# Patient Record
Sex: FEMALE | Race: WHITE | Employment: PART TIME | URBAN - METROPOLITAN AREA
[De-identification: names, ages, dates, MRNs, and addresses within clinical notes are randomized per-mention and may not be internally consistent; named-entity substitution may affect disease eponyms.]

---

## 2017-08-31 NOTE — PATIENT INSTRUCTIONS
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
## 2017-09-05 ENCOUNTER — OFFICE VISIT (OUTPATIENT)
Dept: FAMILY MEDICINE | Facility: CLINIC | Age: 62
End: 2017-09-05

## 2017-09-05 VITALS
OXYGEN SATURATION: 98 % | BODY MASS INDEX: 43.36 KG/M2 | WEIGHT: 254 LBS | TEMPERATURE: 98.6 F | HEIGHT: 64 IN | HEART RATE: 100 BPM | RESPIRATION RATE: 18 BRPM | SYSTOLIC BLOOD PRESSURE: 134 MMHG | DIASTOLIC BLOOD PRESSURE: 82 MMHG

## 2017-09-05 DIAGNOSIS — E63.9 NUTRITIONAL DEFICIENCY: ICD-10-CM

## 2017-09-05 DIAGNOSIS — E55.9 VITAMIN D DEFICIENCY: ICD-10-CM

## 2017-09-05 DIAGNOSIS — Z23 VACCINE FOR DIPHTHERIA-TETANUS: ICD-10-CM

## 2017-09-05 DIAGNOSIS — Z13.0 SCREENING, ANEMIA, DEFICIENCY, IRON: ICD-10-CM

## 2017-09-05 DIAGNOSIS — Z13.228 SCREENING FOR METABOLIC DISORDER: ICD-10-CM

## 2017-09-05 DIAGNOSIS — Z13.6 ENCOUNTER FOR LIPID SCREENING FOR CARDIOVASCULAR DISEASE: ICD-10-CM

## 2017-09-05 DIAGNOSIS — Z00.00 ROUTINE HISTORY AND PHYSICAL EXAMINATION OF ADULT: ICD-10-CM

## 2017-09-05 DIAGNOSIS — Z83.3 FHX: DIABETES MELLITUS: ICD-10-CM

## 2017-09-05 DIAGNOSIS — R25.1 TREMOR: ICD-10-CM

## 2017-09-05 DIAGNOSIS — Z12.31 ENCOUNTER FOR SCREENING MAMMOGRAM FOR BREAST CANCER: ICD-10-CM

## 2017-09-05 DIAGNOSIS — Z23 VACCINE FOR STREPTOCOCCUS PNEUMONIAE AND INFLUENZA: ICD-10-CM

## 2017-09-05 DIAGNOSIS — Z23 NEED FOR PROPHYLACTIC VACCINATION AND INOCULATION AGAINST INFLUENZA: ICD-10-CM

## 2017-09-05 DIAGNOSIS — L84 CALLUS OF FOOT: ICD-10-CM

## 2017-09-05 DIAGNOSIS — Z13.820 SCREENING FOR OSTEOPOROSIS: Primary | ICD-10-CM

## 2017-09-05 DIAGNOSIS — Z13.220 ENCOUNTER FOR LIPID SCREENING FOR CARDIOVASCULAR DISEASE: ICD-10-CM

## 2017-09-05 DIAGNOSIS — Z11.59 NEED FOR HEPATITIS C SCREENING TEST: ICD-10-CM

## 2017-09-05 LAB
% GRANULOCYTES: 71.1 % (ref 42.2–75.2)
HCT VFR BLD AUTO: 43.8 % (ref 35–46)
HEMOGLOBIN: 14.5 G/DL (ref 11.8–15.5)
LYMPHOCYTES NFR BLD AUTO: 23.3 % (ref 20.5–51.1)
MCH RBC QN AUTO: 28.9 PG (ref 27–31)
MCHC RBC AUTO-ENTMCNC: 33 G/DL (ref 33–37)
MCV RBC AUTO: 87.4 FL (ref 80–100)
MONOCYTES NFR BLD AUTO: 5.6 % (ref 1.7–9.3)
PLATELET # BLD AUTO: 234 K/UL (ref 140–450)
RBC # BLD AUTO: 5.01 X10/CMM (ref 3.7–5.2)
WBC # BLD AUTO: 5.8 X10/CMM (ref 3.8–11)

## 2017-09-05 PROCEDURE — 85025 COMPLETE CBC W/AUTO DIFF WBC: CPT | Performed by: FAMILY MEDICINE

## 2017-09-05 PROCEDURE — 90686 IIV4 VACC NO PRSV 0.5 ML IM: CPT | Performed by: FAMILY MEDICINE

## 2017-09-05 PROCEDURE — 90715 TDAP VACCINE 7 YRS/> IM: CPT | Performed by: FAMILY MEDICINE

## 2017-09-05 PROCEDURE — 99213 OFFICE O/P EST LOW 20 MIN: CPT | Mod: 25 | Performed by: FAMILY MEDICINE

## 2017-09-05 PROCEDURE — 90732 PPSV23 VACC 2 YRS+ SUBQ/IM: CPT | Performed by: FAMILY MEDICINE

## 2017-09-05 PROCEDURE — 36415 COLL VENOUS BLD VENIPUNCTURE: CPT | Performed by: FAMILY MEDICINE

## 2017-09-05 PROCEDURE — 90472 IMMUNIZATION ADMIN EACH ADD: CPT | Performed by: FAMILY MEDICINE

## 2017-09-05 PROCEDURE — 99386 PREV VISIT NEW AGE 40-64: CPT | Mod: 25 | Performed by: FAMILY MEDICINE

## 2017-09-05 PROCEDURE — 90471 IMMUNIZATION ADMIN: CPT | Performed by: FAMILY MEDICINE

## 2017-09-05 RX ORDER — PROPRANOLOL HYDROCHLORIDE 20 MG/1
20 TABLET ORAL 2 TIMES DAILY
Qty: 60 TABLET | Refills: 1 | Status: SHIPPED | OUTPATIENT
Start: 2017-09-05 | End: 2017-11-03

## 2017-09-05 NOTE — MR AVS SNAPSHOT
After Visit Summary   9/5/2017    Lucero Davis    MRN: [id number]           Patient Information     Date Of Birth          1955        Visit Information        Provider Department      9/5/2017 1:00 PM Karin Lundberg MD [facility]        Today's Diagnoses     Screening for osteoporosis    -  1    Tremor        Routine history and physical examination of adult        Screening, anemia, deficiency, iron        Screening for metabolic disorder        Encounter for screening mammogram for breast cancer        Vaccine for streptococcus pneumoniae and influenza        Encounter for lipid screening for cardiovascular disease        Need for hepatitis C screening test        Vitamin D deficiency        Vaccine for diphtheria-tetanus        FHx: diabetes mellitus          Care Instructions      Preventive Health Recommendations  Female Ages 50 - 64    Yearly exam: See your health care provider every year in order to  o Review health changes.   o Discuss preventive care.    o Review your medicines if your doctor has prescribed any.      Get a Pap test every three years (unless you have an abnormal result and your provider advises testing more often).    If you get Pap tests with HPV test, you only need to test every 5 years, unless you have an abnormal result.     You do not need a Pap test if your uterus was removed (hysterectomy) and you have not had cancer.    You should be tested each year for STDs (sexually transmitted diseases) if you're at risk.     Have a mammogram every 1 to 2 years.    Have a colonoscopy at age 50, or have a yearly FIT test (stool test). These exams screen for colon cancer.      Have a cholesterol test every 5 years, or more often if advised.    Have a diabetes test (fasting glucose) every three years. If you are at risk for diabetes, you should have this test more often.     If you are at risk for osteoporosis (brittle bone disease), think about having a bone  density scan (DEXA).    Shots: Get a flu shot each year. Get a tetanus shot every 10 years.    Nutrition:     Eat at least 5 servings of fruits and vegetables each day.    Eat whole-grain bread, whole-wheat pasta and brown rice instead of white grains and rice.    Talk to your provider about Calcium and Vitamin D.     Lifestyle    Exercise at least 150 minutes a week (30 minutes a day, 5 days a week). This will help you control your weight and prevent disease.    Limit alcohol to one drink per day.    No smoking.     Wear sunscreen to prevent skin cancer.     See your dentist every six months for an exam and cleaning.    See your eye doctor every 1 to 2 years.    Cologuard call insurance to see if covered for colon cancer screen    Schedule mammogram and dexa scan for screening    Labs today    Trial Propranolol. Hold if HR< or= to 50    F/u Dr GOLD 2-4 weeks prn          Follow-ups after your visit        Future tests that were ordered for you today     Open Future Orders        Priority Expected Expires Ordered    MAMMO -  Screening Digital Bilateral ([facility]) Routine  9/5/2018 9/5/2017    DEXA - Hip/Pelvis/Spine ([facility]UT[facility]) Routine  9/5/2018 9/5/2017            Who to contact     If you have questions or need follow up information about today's clinic visit or your schedule please contact [facility] directly at [phone].  Normal or non-critical lab and imaging results will be communicated to you by MyChart, letter or phone within 4 business days after the clinic has received the results. If you do not hear from us within 7 days, please contact the clinic through MyChart or phone. If you have a critical or abnormal lab result, we will notify you by phone as soon as possible.  Submit refill requests through "[employer]" or call your pharmacy and they will forward the refill request to us. Please allow 3 business days for your refill to be completed.          Additional Information  "About Your Visit        [employer] Information     [employer] lets you send messages to your doctor, view your test results, renew your prescriptions, schedule appointments and more. To sign up, go to [email or web address] . Click on \"Log in\" on the left side of the screen, which will take you to the Welcome page. Then click on \"Sign up Now\" on the right side of the page.     You will be asked to enter the access code listed below, as well as some personal information. Please follow the directions to create your username and password.     Your access code is: [email or web address]  Expires: 2017  1:45 PM     Your access code will  in 90 days. If you need help or a new code, please call your [place] clinic or [phone].        Care EveryWhere ID     This is your Care EveryWhere ID. This could be used by other organizations to access your [place] medical records  [id number]        Your Vitals Were     Pulse Temperature Respirations Height Pulse Oximetry BMI (Body Mass Index)    100 98.6  F (37  C) (Oral) 18 1.626 m (5' 4\") 98% 43.6 kg/m2       Blood Pressure from Last 3 Encounters:   17 134/82    Weight from Last 3 Encounters:   17 115.2 kg (254 lb)              We Performed the Following     ADMIN: Vaccine, Initial (31309)     CBC with Diff/Plt ([facility])     Comp. Metabolic Panel (14) ([facility])     FLU VAC, SPLIT VIRUS IM > 3 YO (QUADRIVALENT) [09022]     HCV Antibody ([facility])     Hemoglobin A1C ([facility])     Lipid Panel ([facility])     Pneumococcal vaccine 23 valent PPSV23  (Pneumovax) [66763]     TDAP VACCINE (BOOSTRIX)     TSH ([facility])     Vitamin D  25-Hydroxy ([facility])          Today's Medication Changes          These changes are accurate as of: 17  1:50 PM.  If you have any questions, ask your nurse or doctor.               Start taking these medicines.        Dose/Directions    propranolol 20 MG tablet   Commonly known as:  INDERAL   Used for:  Tremor   Started by:  Karin Lundberg MD     "    Dose:  20 mg   Take 1 tablet (20 mg) by mouth 2 times daily   Quantity:  60 tablet   Refills:  1            Where to get your medicines      These medications were sent to [facility] [address] [address]  [address] [place] [id number]     Phone:  [phone]     propranolol 20 MG tablet                Primary Care Provider Office Phone # Fax #    Karin Lundberg [phone] [address]  [facility] [address]        Equal Access to Services     [facility]: Hadii aad ku hadasho Soomaali, waaxda luqadaha, qaybta kaalmada adeegyada, carlos guerrero hayaapenny archuleta . So [facility] [phone].    ATENCIÓN: Si habla español, tiene a adler disposición servicios gratuitos de asistencia lingüística. Ll[facility] [phone].    We comply with applicable federal civil rights laws and [place] laws. We do not discriminate on the basis of race, color, national origin, age, disability sex, sexual orientation or gender identity.            Thank you!     Thank you for choosing [facility]  for your care. Our goal is always to provide you with excellent care. Hearing back from our patients is one way we can continue to improve our services. Please take a few minutes to complete the written survey that you may receive in the mail after your visit with us. Thank you!             Your Updated Medication List - Protect others around you: Learn how to safely use, store and throw away your medicines at www.disposemymeds.org.          This list is accurate as of: 9/5/17  1:50 PM.  Always use your most recent med list.                   Brand Name Dispense Instructions for use Diagnosis    MULTIVITAL PO           propranolol 20 MG tablet    INDERAL    60 tablet    Take 1 tablet (20 mg) by mouth 2 times daily    Tremor

## 2017-09-05 NOTE — PROGRESS NOTES
Injectable Influenza Immunization Documentation    1.  Is the person to be vaccinated sick today?  No    2. Does the person to be vaccinated have an allergy to eggs or to a component of the vaccine?  No    3. Has the person to be vaccinated today ever had a serious reaction to influenza vaccine in the past?  No    4. Has the person to be vaccinated ever had Guillain-Edgewood syndrome?  No     Form completed by Rena Brooks MA September 5, 2017 1:57 PM

## 2017-09-05 NOTE — LETTER
[facility]  [address] [place]  [address]  Phone: [phone]    September 8, 2017      Lucero Davis  [address]  [facility] [address]              Dear Lucero,      Comprehensive Metabolic Panel (CMP) was normal. The lipid panel indicated that your LDL was 157, which means that your total cholesterol is elevated.  Eat a low cholesterol diet. Recheck fasting lipid levels in 1 year. Hepetitis C was negative. Your vitamin D levels were low. START  TAKING A VITAMIN D  SUPPLEMENT 2000 IU daily which you can find at your local pharmacy.    Your thyroid levels (TSH) and blood glucose (A1C) was normal. Complete Blood Count (CBC) was normal.        Sincerely,     Karin Lundberg M.D.    Results for orders placed or performed in visit on 09/05/17   CBC with Diff/Plt ([facility])   Result Value Ref Range    WBC x10/cmm 5.8 3.8 - 11.0 x10/cmm    % Lymphocytes 23.3 20.5 - 51.1 %    % Monocytes 5.6 1.7 - 9.3 %    % Granulocytes 71.1 42.2 - 75.2 %    RBC x10/cmm 5.01 3.7 - 5.2 x10/cmm    Hemoglobin 14.5 11.8 - 15.5 g/dl    Hematocrit 43.8 35 - 46 %    MCV 87.4 80 - 100 fL    MCH 28.9 27.0 - 31.0 pg    MCHC 33.0 33.0 - 37.0 g/dL    Platelet Count 234 140 - 450 K/uL   Comp. Metabolic Panel (14) ([facility])   Result Value Ref Range    Glucose 90 65 - 99 mg/dL    Urea Nitrogen 16 8 - 27 mg/dL    Creatinine 0.69 0.57 - 1.00 mg/dL    eGFR If NonAfricn Am 94 >59 mL/min/1.73    eGFR If Africn Am 109 >59 mL/min/1.73    BUN/Creatinine Ratio 23 12 - 28    Sodium 143 134 - 144 mmol/L    Potassium 4.0 3.5 - 5.2 mmol/L    Chloride 102 96 - 106 mmol/L    Total CO2 27 18 - 28 mmol/L    Calcium 9.1 8.7 - 10.3 mg/dL    Protein Total 7.1 6.0 - 8.5 g/dL    Albumin 4.4 3.6 - 4.8 g/dL    Globulin, Total 2.7 1.5 - 4.5 g/dL    A/G Ratio 1.6 1.2 - 2.2    Bilirubin Total 0.5 0.0 - 1.2 mg/dL    Alkaline Phosphatase 92 39 - 117 IU/L    AST 15 0 - 40 IU/L    ALT 12 0 - 32 IU/L    Narrative    Performed at:  01 - [facility]  [address]  [place], [place]  [id number]  : Tommy Gottlieb MD, Phone:  [phone]   Lipid Panel ([facility])   Result Value Ref Range    Cholesterol 233 (H) 100 - 199 mg/dL    Triglycerides 76 0 - 149 mg/dL    HDL Cholesterol 61 >39 mg/dL    VLDL Cholesterol Bo 15 5 - 40 mg/dL    LDL Cholesterol Calculated 157 (H) 0 - 99 mg/dL    LDL/HDL Ratio 2.6 0.0 - 3.2 ratio units    Narrative    Performed at:  01 - [facility] [address], [place], [place]  [id number]  : Tommy Gottlieb MD, Phone:  [phone]   HCV Antibody ([facility])   Result Value Ref Range    Hep C Virus Ab <0.1 0.0 - 0.9 s/co ratio    Narrative    Performed at:  01 - [facility] [address], [place], [place]  [id number]  : Tommy Gottlieb MD, Phone:  [phone]   Vitamin D  25-Hydroxy ([facility])   Result Value Ref Range    Vitamin D,25-Hydroxy 16.6 (L) 30.0 - 100.0 ng/mL    Narrative    Performed at:  01 - [facility] [address], [place], [place]  [id number]  : Tommy Gottlieb MD, Phone:  [phone]   TSH ([facility])   Result Value Ref Range    TSH 1.700 0.450 - 4.500 uIU/mL    Narrative    Performed at:  01 - [facility] [address], [place], [place]  [id number]  : Tommy Gottlieb MD, Phone:  [phone]   Hemoglobin A1C ([facility])   Result Value Ref Range    Hemoglobin A1C 5.6 4.8 - 5.6 %    Narrative    Performed at:  01 - [facility] [address], [place], [place]  [id number]  : Tommy Gottlieb MD, Phone:  [phone]

## 2017-09-06 LAB
ALBUMIN SERPL-MCNC: 4.4 G/DL (ref 3.6–4.8)
ALBUMIN/GLOB SERPL: 1.6 {RATIO} (ref 1.2–2.2)
ALP SERPL-CCNC: 92 IU/L (ref 39–117)
ALT SERPL-CCNC: 12 IU/L (ref 0–32)
AST SERPL-CCNC: 15 IU/L (ref 0–40)
BILIRUB SERPL-MCNC: 0.5 MG/DL (ref 0–1.2)
BUN SERPL-MCNC: 16 MG/DL (ref 8–27)
BUN/CREATININE RATIO: 23 (ref 12–28)
CALCIUM SERPL-MCNC: 9.1 MG/DL (ref 8.7–10.3)
CHLORIDE SERPLBLD-SCNC: 102 MMOL/L (ref 96–106)
CHOLEST SERPL-MCNC: 233 MG/DL (ref 100–199)
CREAT SERPL-MCNC: 0.69 MG/DL (ref 0.57–1)
EGFR IF AFRICN AM: 109 ML/MIN/1.73
EGFR IF NONAFRICN AM: 94 ML/MIN/1.73
GLOBULIN, TOTAL: 2.7 G/DL (ref 1.5–4.5)
GLUCOSE SERPL-MCNC: 90 MG/DL (ref 65–99)
HBA1C MFR BLD: 5.6 % (ref 4.8–5.6)
HCV AB SERPL QL IA: <0.1 S/CO RATIO (ref 0–0.9)
HDLC SERPL-MCNC: 61 MG/DL
LDL/HDL RATIO: 2.6 RATIO UNITS (ref 0–3.2)
LDLC SERPL CALC-MCNC: 157 MG/DL (ref 0–99)
POTASSIUM SERPL-SCNC: 4 MMOL/L (ref 3.5–5.2)
PROT SERPL-MCNC: 7.1 G/DL (ref 6–8.5)
SODIUM SERPL-SCNC: 143 MMOL/L (ref 134–144)
TOTAL CO2: 27 MMOL/L (ref 18–28)
TRIGL SERPL-MCNC: 76 MG/DL (ref 0–149)
TSH BLD-ACNC: 1.7 UIU/ML (ref 0.45–4.5)
VITAMIN D, 25-HYDROXY: 16.6 NG/ML (ref 30–100)
VLDLC SERPL CALC-MCNC: 15 MG/DL (ref 5–40)

## 2017-09-07 PROBLEM — L84 CALLUS OF FOOT: Status: ACTIVE | Noted: 2017-09-07

## 2017-09-07 PROBLEM — R25.1 TREMOR: Status: ACTIVE | Noted: 2017-09-07

## 2017-09-30 NOTE — PROGRESS NOTES
Cc f/u tremor    HCM:   Pap- needs to schedule still  Mammo yesterday  Colon Cancer screen Cologuard is covered Form completed today  Living will  Has form and still working on it    SUBJECTIVE:   Lucero Davis is a 62 year old female who presents to clinic today for the following health issues:    Patient is taking propranolol 10 mg in the AM, 10 mg in the afternoon, and 20 mg at night. She takes the medication with breakfast in the AM, and with no food in the afternoon and at night. Patient feels dizzy with lightheadedness after taking the medication, no nausea, no vomiting, no abdominal pain, no chest pain, no palpitations, no shortness of breath. States that the medication is effective in decreasing the amount of tremors she has throughout the day. Tremors are worse as the day progresses, but has no tremors present after waking up in the morning.     Tremor treated with Propranolol 20 mg tablet po BID- took dosing listed above    TSH was in the normal range      Duration: few months, ongoing     Description (location/character/radiation): Hands tremors, bilateral; left hand > right hand, no pain present    Intensity:  moderate    Accompanying signs and symptoms: no pain, no numbness, no tingling, no palpitations, no chest pain, no shortness of breath.     History (similar episodes/previous evaluation): None    Precipitating or alleviating factors: None    Therapies tried and outcome: see above medication.              Problem list and histories reviewed & adjusted, as indicated.  Additional history: as documented    Patient Active Problem List   Diagnosis     Tremor     Callus of foot     BMI 45.0-49.9, adult (H)     Osteopenia of both hips     Past Surgical History:   Procedure Laterality Date     EXTRACTION(S) DENTAL         Social History   Substance Use Topics     Smoking status: Never Smoker     Smokeless tobacco: Never Used     Alcohol use Yes      Comment: one can per month      Family History   Problem  Relation Age of Onset     Prostate Cancer Father      Colon Cancer Maternal Grandmother      HEART DISEASE Maternal Grandfather      Colon Cancer Paternal Grandmother      Influenza/Pneumonia Paternal Grandfather          Current Outpatient Prescriptions   Medication Sig Dispense Refill     Multiple Vitamins-Minerals (MULTIVITAL PO)        propranolol (INDERAL) 20 MG tablet Take 1 tablet (20 mg) by mouth 2 times daily 60 tablet 1     No Known Allergies  Recent Labs   Lab Test  09/05/17   1409   A1C  5.6   LDL  157*   HDL  61   TRIG  76   ALT  12   CR  0.69   POTASSIUM  4.0      BP Readings from Last 3 Encounters:   10/06/17 144/86   09/05/17 134/82    Wt Readings from Last 3 Encounters:   09/05/17 115.2 kg (254 lb)                  Labs reviewed in EPIC    Now can:  Cooking- can carry the pots/pans better  Writing is still hard  Worse tremor when anxious    Sleep 8 hrs  Appetite good  Exercise foot pain still, has appointment this afternoon    Smoking no  ETOH no  Street drugs/MJ no  Caffeine 1/2 c in am coffee      Reviewed and updated as needed this visit by clinical staff     Reviewed and updated as needed this visit by Provider         ROS:  Constitutional, HEENT, cardiovascular, pulmonary, GI, , musculoskeletal, neuro, skin, endocrine and psych systems are negative, except as otherwise noted.      OBJECTIVE:   /86  Pulse 72  Temp 98.3  F (36.8  C) (Oral)  Resp 16  SpO2 98%  There is no height or weight on file to calculate BMI.  GENERAL: healthy, alert and no distress obese white female glasses  EYES: Eyes grossly normal to inspection, PERRL and conjunctivae and sclerae normal  HENT: ear canals and TM's normal, nose and mouth without ulcers or lesions  NECK: no adenopathy, no asymmetry, masses, or scars and thyroid normal to palpation  RESP: lungs clear to auscultation - no rales, rhonchi or wheezes  CV: regular rate and rhythm, normal S1 S2, no S3 or S4, no murmur, click or rub, no peripheral edema  and peripheral pulses strong  ABDOMEN: soft, nontender, no hepatosplenomegaly, no masses and bowel sounds normal  MS: no gross musculoskeletal defects noted, no edema  SKIN: no suspicious lesions or rashes  NEURO: Normal strength and tone, mentation intact and speech normal Fine tremor better at rest but still present. No intension tremor. No cog wheeling.   PSYCH: mentation appears normal, affect normal/bright  LYMPH: no cervical, supraclavicular, axillary, or inguinal adenopathy    Diagnostic Test Results:  Results for orders placed or performed during the hospital encounter of 10/05/17   MAMMO -  Screening Digital Bilateral ([facility]UTURE/[facility])    Narrative    Examination: Bilateral digital screening mammography with computer  aided detection, 10/5/2017 2:33 PM.    Comparison: None    History: No current breast concerns.    BREAST DENSITY: Scattered fibroglandular densities.    COMMENTS:  No suspicious finding.      Impression    IMPRESSION: BI-RADS CATEGORY: 1 -  NEGATIVE.    RECOMMENDED FOLLOW-UP: Annual Mammography.      The patient will be notified of the results.     ZAFAR STEVEN MD       ASSESSMENT/PLAN:     ASSESSMENT / PLAN:  (G25.0) Essential tremor  (primary encounter diagnosis)  Comment: adding  Plan: gabapentin (NEURONTIN) 300 MG capsule                Patient Instructions   Propranolol continue the 10 mg/10mg and 20 mg    Add gabapentin 300 mg at bedtime.    F/u one month    Cologuard form          Karin Lundberg MD  [facility]

## 2017-10-05 ENCOUNTER — HOSPITAL ENCOUNTER (OUTPATIENT)
Dept: BONE DENSITY | Facility: CLINIC | Age: 62
Discharge: HOME | End: 2017-10-05
Attending: FAMILY MEDICINE | Admitting: FAMILY MEDICINE
Payer: COMMERCIAL

## 2017-10-05 ENCOUNTER — HOSPITAL ENCOUNTER (OUTPATIENT)
Dept: MAMMOGRAPHY | Facility: CLINIC | Age: 62
End: 2017-10-05
Attending: FAMILY MEDICINE
Payer: COMMERCIAL

## 2017-10-05 DIAGNOSIS — Z13.820 SCREENING FOR OSTEOPOROSIS: ICD-10-CM

## 2017-10-05 DIAGNOSIS — Z12.31 ENCOUNTER FOR SCREENING MAMMOGRAM FOR BREAST CANCER: ICD-10-CM

## 2017-10-05 PROCEDURE — 77080 DXA BONE DENSITY AXIAL: CPT

## 2017-10-05 PROCEDURE — G0202 SCR MAMMO BI INCL CAD: HCPCS

## 2017-10-06 ENCOUNTER — OFFICE VISIT (OUTPATIENT)
Dept: FAMILY MEDICINE | Facility: CLINIC | Age: 62
End: 2017-10-06

## 2017-10-06 VITALS
RESPIRATION RATE: 16 BRPM | TEMPERATURE: 98.3 F | DIASTOLIC BLOOD PRESSURE: 86 MMHG | OXYGEN SATURATION: 98 % | HEART RATE: 72 BPM | SYSTOLIC BLOOD PRESSURE: 144 MMHG

## 2017-10-06 DIAGNOSIS — G25.0 ESSENTIAL TREMOR: Primary | ICD-10-CM

## 2017-10-06 PROBLEM — M85.852 OSTEOPENIA OF BOTH HIPS: Status: ACTIVE | Noted: 2017-10-06

## 2017-10-06 PROBLEM — M85.851 OSTEOPENIA OF BOTH HIPS: Status: ACTIVE | Noted: 2017-10-06

## 2017-10-06 PROCEDURE — 99214 OFFICE O/P EST MOD 30 MIN: CPT | Performed by: FAMILY MEDICINE

## 2017-10-06 RX ORDER — GABAPENTIN 300 MG/1
CAPSULE ORAL
Qty: 30 CAPSULE | Refills: 3 | Status: SHIPPED | OUTPATIENT
Start: 2017-10-06 | End: 2017-11-03

## 2017-10-06 NOTE — MR AVS SNAPSHOT
"              After Visit Summary   10/6/2017    Lucero Davis    MRN: [id number]           Patient Information     Date Of Birth          1955        Visit Information        Provider Department      10/6/2017 9:00 AM Karin Lundberg MD [facility]        Today's Diagnoses     Essential tremor    -  1      Care Instructions    Propranolol continue the 10 mg/10mg and 20 mg    Add gabapentin 300 mg at bedtime.    F/u one month    Cologuard form              Follow-ups after your visit        Who to contact     If you have questions or need follow up information about today's clinic visit or your schedule please contact [facility] directly at [phone].  Normal or non-critical lab and imaging results will be communicated to you by Vayablehart, letter or phone within 4 business days after the clinic has received the results. If you do not hear from us within 7 days, please contact the clinic through Vayablehart or phone. If you have a critical or abnormal lab result, we will notify you by phone as soon as possible.  Submit refill requests through Big Think or call your pharmacy and they will forward the refill request to us. Please allow 3 business days for your refill to be completed.          Additional Information About Your Visit        MyChart Information     Big Think lets you send messages to your doctor, view your test results, renew your prescriptions, schedule appointments and more. To sign up, go to [email or web address] . Click on \"Log in\" on the left side of the screen, which will take you to the Welcome page. Then click on \"Sign up Now\" on the right side of the page.     You will be asked to enter the access code listed below, as well as some personal information. Please follow the directions to create your username and password.     Your access code is: [email or web address]  Expires: 2017  1:45 PM     Your access code will  in 90 days. If you need help or a new code, please " call your [place] clinic or [phone].        Care EveryWhere ID     This is your Care EveryWhere ID. This could be used by other organizations to access your [place] medical records  [id number]        Your Vitals Were     Pulse Temperature Respirations Pulse Oximetry          72 98.3  F (36.8  C) (Oral) 16 98%         Blood Pressure from Last 3 Encounters:   10/06/17 144/86   09/05/17 134/82    Weight from Last 3 Encounters:   09/05/17 115.2 kg (254 lb)              Today, you had the following     No orders found for display         Today's Medication Changes          These changes are accurate as of: 10/6/17  9:34 AM.  If you have any questions, ask your nurse or doctor.               Start taking these medicines.        Dose/Directions    gabapentin 300 MG capsule   Commonly known as:  NEURONTIN   Used for:  Essential tremor   Started by:  Karin Lundberg MD        Take 1 tablet (300 mg) every night for 1-3 days, then 1 tablet twice daily for 1-3 days, then 1 tablet three times daily   Quantity:  30 capsule   Refills:  3            Where to get your medicines      These medications were sent to [facility] [address] [place] [address] & [address], [place] [address]     Phone:  [phone]     gabapentin 300 MG capsule                Primary Care Provider Office Phone # Fax #    Karin Lundberg [phone] [address] [place] [address]        Equal Access to Services     [facility]: Hadii aad ku hadasho Soomaali, waaxda luqadaha, qaybta kaalmada adeegyada, waxay cesar haymatthew archuleta . So [facility] [phone].    ATENCIÓN: Si habla español, tiene a adler disposición servicios gratuitos de asistencia lingüística. Llame al [phone].    We comply with applicable federal civil rights laws and [place] laws. We do not discriminate on the basis of race, color, national origin, age, disability, sex,  sexual orientation, or gender identity.            Thank you!     Thank you for choosing [facility]  for your care. Our goal is always to provide you with excellent care. Hearing back from our patients is one way we can continue to improve our services. Please take a few minutes to complete the written survey that you may receive in the mail after your visit with us. Thank you!             Your Updated Medication List - Protect others around you: Learn how to safely use, store and throw away your medicines at www.disposemymeds.org.          This list is accurate as of: 10/6/17  9:34 AM.  Always use your most recent med list.                   Brand Name Dispense Instructions for use Diagnosis    gabapentin 300 MG capsule    NEURONTIN    30 capsule    Take 1 tablet (300 mg) every night for 1-3 days, then 1 tablet twice daily for 1-3 days, then 1 tablet three times daily    Essential tremor       MULTIVITAL PO       Nutritional deficiency       propranolol 20 MG tablet    INDERAL    60 tablet    Take 1 tablet (20 mg) by mouth 2 times daily    Tremor

## 2017-10-06 NOTE — PATIENT INSTRUCTIONS
Propranolol continue the 10 mg/10mg and 20 mg    Add gabapentin 300 mg at bedtime.    F/u one month    Cologuard form

## 2017-10-25 NOTE — PROGRESS NOTES
"Plan from OV on 10/6/2017:   Propranolol continue the 10 mg/10mg and 20 mg  Add gabapentin 300 mg at bedtime.  F/u one month  Cologuard form- came in the mail but still needs to do    HCM:   PAP- will schedule when she is able to do so.   Colon Cancer- received package yesterday.   Living Will- form given to patient       Cc: Follow-up for essential  Tremor; currently taking Propranolol; 10 mg in the AM, 10 mg in the afternoon, and 20 mg at bedtime.   Gabapentin 300 mg BID last took it Sunday ran out - effective with Propranolol for tremors, need refill. Unable to refill due to quantity of Rx #30, per pharmacy. Makes her tired.  SUBJECTIVE:   Lucero Davis is a 62 year old female who presents to clinic today for the following health issues:    Follow-up Essential Tremors       Duration: Follow-up, ongoing     Description (location/character/radiation): Tremors in both hands, increases when nervous     Intensity:  Tremors has improved since last OV. Currently tremors are 6/10, tremors at worse 8/10    Accompanying signs and symptoms: no pain, no numbness, no tingling, no burning sensations, no new symptoms.     History (similar episodes/previous evaluation): None    Precipitating or alleviating factors: None    Therapies tried and outcome: Propranolol  And Gabapentin -effective for tremors.        Hand tremors today.  Feeding self better-soup    Sleep ok  Appetite ok  Exercise foot hurts limiting walking. Podiatry xray negative fracture. They wanted to give her a brace- planning to buy one on line.  The podiatrist thought it was more of a neurology issue.     Neurology- referral done    Estimated body mass index is 43.6 kg/(m^2) as calculated from the following:    Height as of 9/5/17: 1.626 m (5' 4\").    Weight as of 9/5/17: 115.2 kg (254 lb).  Refer weight loss clinic    Smoking no  ETOH no  Street drugs/MJ no  Caffeine 1/2 c coffee            Problem list and histories reviewed & adjusted, as " indicated.  Additional history: as documented    Patient Active Problem List   Diagnosis     Tremor     Callus of foot     BMI 45.0-49.9, adult (H)     Osteopenia of both hips     Past Surgical History:   Procedure Laterality Date     EXTRACTION(S) DENTAL         Social History   Substance Use Topics     Smoking status: Never Smoker     Smokeless tobacco: Never Used     Alcohol use Yes      Comment: one can per month      Family History   Problem Relation Age of Onset     Prostate Cancer Father      Colon Cancer Maternal Grandmother      HEART DISEASE Maternal Grandfather      Colon Cancer Paternal Grandmother      Influenza/Pneumonia Paternal Grandfather          Current Outpatient Prescriptions   Medication Sig Dispense Refill     gabapentin (NEURONTIN) 300 MG capsule Take 1 tablet (300 mg) every night for 1-3 days, then 1 tablet twice daily for 1-3 days, then 1 tablet three times daily 30 capsule 3     Multiple Vitamins-Minerals (MULTIVITAL PO)        propranolol (INDERAL) 20 MG tablet Take 1 tablet (20 mg) by mouth 2 times daily 60 tablet 1     No Known Allergies  Recent Labs   Lab Test  09/05/17   1409   A1C  5.6   LDL  157*   HDL  61   TRIG  76   ALT  12   CR  0.69   POTASSIUM  4.0      BP Readings from Last 3 Encounters:   11/03/17 144/84   10/06/17 144/86   09/05/17 134/82    Wt Readings from Last 3 Encounters:   09/05/17 115.2 kg (254 lb)                  Labs reviewed in EPIC          Reviewed and updated as needed this visit by clinical staff     Reviewed and updated as needed this visit by Provider         ROS:  Constitutional, HEENT, cardiovascular, pulmonary, GI, , musculoskeletal, neuro, skin, endocrine and psych systems are negative, except as otherwise noted.    Foot and tremor starting this year in the Spring  Fhx negative parkinsons, essential tremor, no chemical exposures    OBJECTIVE:   /84  Pulse 84  Temp 97.8  F (36.6  C) (Oral)  Resp 16  SpO2 97%  There is no height or weight on  file to calculate BMI.  GENERAL: healthy, alert and no distress  EYES: Eyes grossly normal to inspection, PERRL and conjunctivae and sclerae normal  HENT: ear canals and TM's normal, nose and mouth without ulcers or lesions  NECK: no adenopathy, no asymmetry, masses, or scars and thyroid normal to palpation  RESP: lungs clear to auscultation - no rales, rhonchi or wheezes  CV: regular rate and rhythm, normal S1 S2, no S3 or S4, no murmur, click or rub, no peripheral edema and peripheral pulses strong  ABDOMEN: soft, nontender, no hepatosplenomegaly, no masses and bowel sounds normal  MS: no gross musculoskeletal defects noted, no edema  SKIN: no suspicious lesions or rashes  NEURO: Normal strength and tone, mentation intact and speech normal  L>R hand tremor, chin mild intermittent  Foot issues per podiatry  PSYCH: mentation appears normal, affect normal/bright  LYMPH: no cervical, supraclavicular, axillary, or inguinal adenopathy    Diagnostic Test Results:  Results for orders placed or performed during the hospital encounter of 10/05/17   MAMMO -  Screening Digital Bilateral (FUTURE/[facility] Breast [facility])    Narrative    Examination: Bilateral digital screening mammography with computer  aided detection, 10/5/2017 2:33 PM.    Comparison: None    History: No current breast concerns.    BREAST DENSITY: Scattered fibroglandular densities.    COMMENTS:  No suspicious finding.      Impression    IMPRESSION: BI-RADS CATEGORY: 1 -  NEGATIVE.    RECOMMENDED FOLLOW-UP: Annual Mammography.      The patient will be notified of the results.     ZAFAR STEVEN MD       ASSESSMENT/PLAN:     ASSESSMENT / PLAN:  (Z68.41) BMI 40.0-44.9, adult (H)  (primary encounter diagnosis)  Comment:   Plan: BARIATRIC ADULT REFERRAL            (G25.0) Essential tremor  Comment:   Plan: gabapentin (NEURONTIN) 300 MG capsule,         propranolol (INDERAL) 20 MG tablet                Patient Instructions   Refills done    Schedule neurology consult and  weight loss clinic      Karin Lundberg MD  [facility]

## 2017-11-03 ENCOUNTER — OFFICE VISIT (OUTPATIENT)
Dept: FAMILY MEDICINE | Facility: CLINIC | Age: 62
End: 2017-11-03

## 2017-11-03 VITALS
SYSTOLIC BLOOD PRESSURE: 146 MMHG | HEART RATE: 84 BPM | DIASTOLIC BLOOD PRESSURE: 88 MMHG | TEMPERATURE: 97.8 F | OXYGEN SATURATION: 97 % | RESPIRATION RATE: 16 BRPM

## 2017-11-03 DIAGNOSIS — G25.0 ESSENTIAL TREMOR: ICD-10-CM

## 2017-11-03 PROCEDURE — 99214 OFFICE O/P EST MOD 30 MIN: CPT | Performed by: FAMILY MEDICINE

## 2017-11-03 RX ORDER — PROPRANOLOL HYDROCHLORIDE 20 MG/1
20 TABLET ORAL SEE ADMIN INSTRUCTIONS
Qty: 60 TABLET | Refills: 3 | Status: SHIPPED | OUTPATIENT
Start: 2017-11-03 | End: 2017-12-04 | Stop reason: DRUGHIGH

## 2017-11-03 RX ORDER — GABAPENTIN 300 MG/1
CAPSULE ORAL
Qty: 90 CAPSULE | Refills: 3 | Status: SHIPPED | OUTPATIENT
Start: 2017-11-03 | End: 2017-12-04

## 2017-11-03 NOTE — MR AVS SNAPSHOT
After Visit Summary   11/3/2017    Lucero Davis    MRN: [id number]           Patient Information     Date Of Birth          1955        Visit Information        Provider Department      11/3/2017 9:00 AM Karin Lundberg MD [facility]        Today's Diagnoses     BMI 40.0-44.9, adult (H)    -  1    Essential tremor          Care Instructions    Refills done    Schedule neurology consult and weight loss clinic          Follow-ups after your visit        Additional Services     BARIATRIC ADULT REFERRAL       Your provider has referred you to: [facility]: [facility] Surgical Weight Loss [facility] [phone]. [email or web address]    Please be aware that coverage of these services is subject to the terms and limitations of your health insurance plan.  Call member services at your health plan with any benefit or coverage questions.      Please bring the following with you to your appointment:      (1) List of current medications   (2) This referral request   (3) Any documents/labs given to you for this referral                  Who to contact     If you have questions or need follow up information about today's clinic visit or your schedule please contact [facility] directly at [phone].  Normal or non-critical lab and imaging results will be communicated to you by MyChart, letter or phone within 4 business days after the clinic has received the results. If you do not hear from us within 7 days, please contact the clinic through MyChart or phone. If you have a critical or abnormal lab result, we will notify you by phone as soon as possible.  Submit refill requests through Ibercheck or call your pharmacy and they will forward the refill request to us. Please allow 3 business days for your refill to be completed.          Additional Information About Your Visit        MyChart Information     [employer] Biotechhart  "lets you send messages to your doctor, view your test results, renew your prescriptions, schedule appointments and more. To sign up, go to [email or web address] . Click on \"Log in\" on the left side of the screen, which will take you to the Welcome page. Then click on \"Sign up Now\" on the right side of the page.     You will be asked to enter the access code listed below, as well as some personal information. Please follow the directions to create your username and password.     Your access code is: [email or web address]  Expires: 2017  1:45 PM     Your access code will  in 90 days. If you need help or a new code, please call your [place] clinic or [phone].        Care EveryWhere ID     This is your Care EveryWhere ID. This could be used by other organizations to access your [place] medical records  [id number]        Your Vitals Were     Pulse Temperature Respirations Pulse Oximetry          84 97.8  F (36.6  C) (Oral) 16 97%         Blood Pressure from Last 3 Encounters:   17 144/84   10/06/17 144/86   17 134/82    Weight from Last 3 Encounters:   17 115.2 kg (254 lb)              We Performed the Following     BARIATRIC ADULT REFERRAL          Today's Medication Changes          These changes are accurate as of: 11/3/17  9:44 AM.  If you have any questions, ask your nurse or doctor.               These medicines have changed or have updated prescriptions.        Dose/Directions    propranolol 20 MG tablet   Commonly known as:  INDERAL   This may have changed:    - when to take this  - additional instructions   Used for:  Essential tremor   Changed by:  Karin Lundberg MD        Dose:  20 mg   Take 1 tablet (20 mg) by mouth See Admin Instructions 10 mg am, 10 mg afternoon, 20 mg QHS   Quantity:  60 tablet   Refills:  3            Where to get your medicines      These medications were sent to [facility] [address] [place] [address] - [address] "  [address] [facility] [address]     Phone:  [phone]     gabapentin 300 MG capsule    propranolol 20 MG tablet                Primary Care Provider Office Phone # Fax #    Karin Lundberg [phone] [address]  [facility] [address]        Equal Access to Services     [facility]: Hadii aad ku hadasho Soomaali, waaxda luqadaha, qaybta kaalmada adeegyada, waxay idiin hayaan adeeg kharash la'aan . So [facility] [phone].    ATENCIÓN: Si habla español, tiene a adler disposición servicios gratuitos de asistencia lingüística. Llame al [phone].    We comply with applicable federal civil rights laws and [place] laws. We do not discriminate on the basis of race, color, national origin, age, disability, sex, sexual orientation, or gender identity.            Thank you!     Thank you for choosing [facility]  for your care. Our goal is always to provide you with excellent care. Hearing back from our patients is one way we can continue to improve our services. Please take a few minutes to complete the written survey that you may receive in the mail after your visit with us. Thank you!             Your Updated Medication List - Protect others around you: Learn how to safely use, store and throw away your medicines at www.disposemymeds.org.          This list is accurate as of: 11/3/17  9:44 AM.  Always use your most recent med list.                   Brand Name Dispense Instructions for use Diagnosis    gabapentin 300 MG capsule    NEURONTIN    90 capsule    Take 1 tablet (300 mg) every night for 1-3 days, then 1 tablet twice daily for 1-3 days, then 1 tablet three times daily    Essential tremor       MULTIVITAL PO       Nutritional deficiency       propranolol 20 MG tablet    INDERAL    60 tablet    Take 1 tablet (20 mg) by mouth See Admin Instructions 10 mg am, 10 mg afternoon, 20 mg QHS    Essential tremor

## 2017-11-30 NOTE — PROGRESS NOTES
HCM:  PAP will consider in the future  Colon Cancer Screen  Got colo guard  Living Will has form    Last OV on 11/3/2017:  Refills done; gabapentin 300 mg, take one tablet TID, last refill on 11/3/2017 #90 with 3 refills and propranolol 20 mg, last refill on 11/3/2017 with 3 refills.    Schedule neurology consult and weight loss clinic  SUBJECTIVE:   Lucero Davis is a 62 year old female who presents to clinic today for the following health issues:    Essential Tremors       Duration: follow up, tremors seem to be worse today, has not had time to schedule appointment with weight loss clinic, never schedule appointment with neurology because she did not receive a call from them    Description (location/character/radiation): states that tremors has not improved or worsen since last OV       Intensity:  moderate    Accompanying signs and symptoms: none    History (similar episodes/previous evaluation): None    Precipitating or alleviating factors: none    Therapies tried and outcome: gabapentin, and propranolol - somewhat effective    No change in tremor  HR above 50  BP Readings from Last 3 Encounters:   12/04/17 148/86   11/03/17 146/88   10/06/17 144/86               Problem list and histories reviewed & adjusted, as indicated.  Additional history: as documented    Patient Active Problem List   Diagnosis     Tremor     Callus of foot     BMI 45.0-49.9, adult (H)     Osteopenia of both hips     Past Surgical History:   Procedure Laterality Date     EXTRACTION(S) DENTAL         Social History   Substance Use Topics     Smoking status: Never Smoker     Smokeless tobacco: Never Used     Alcohol use Yes      Comment: one can per month      Family History   Problem Relation Age of Onset     Prostate Cancer Father      Colon Cancer Maternal Grandmother      HEART DISEASE Maternal Grandfather      Colon Cancer Paternal Grandmother      Influenza/Pneumonia Paternal Grandfather          Current Outpatient Prescriptions    Medication Sig Dispense Refill     Vitamin D, Cholecalciferol, 1000 UNITS TABS        gabapentin (NEURONTIN) 300 MG capsule Take 1 tablet (300 mg) every night for 1-3 days, then 1 tablet twice daily for 1-3 days, then 1 tablet three times daily 90 capsule 3     propranolol (INDERAL) 20 MG tablet Take 1 tablet (20 mg) by mouth See Admin Instructions 10 mg am, 10 mg afternoon, 20 mg QHS 60 tablet 3     Multiple Vitamins-Minerals (MULTIVITAL PO)        No Known Allergies  Recent Labs   Lab Test  09/05/17   1409   A1C  5.6   LDL  157*   HDL  61   TRIG  76   ALT  12   CR  0.69   POTASSIUM  4.0      BP Readings from Last 3 Encounters:   12/04/17 148/86   11/03/17 146/88   10/06/17 144/86    Wt Readings from Last 3 Encounters:   12/04/17 117.9 kg (260 lb)   09/05/17 115.2 kg (254 lb)                  Labs reviewed in EPIC          Reviewed and updated as needed this visit by clinical staff       Reviewed and updated as needed this visit by Provider         ROS:  Constitutional, HEENT, cardiovascular, pulmonary, GI, , musculoskeletal, neuro, skin, endocrine and psych systems are negative, except as otherwise noted.      OBJECTIVE:   /86  Pulse 74  Temp 98.2  F (36.8  C) (Oral)  Resp 16  Wt 117.9 kg (260 lb)  SpO2 98%  BMI 44.63 kg/m2  Body mass index is 44.63 kg/(m^2).  GENERAL: healthy, alert and no distress  EYES: Eyes grossly normal to inspection, PERRL and conjunctivae and sclerae normal  HENT: ear canals and TM's normal, nose and mouth without ulcers or lesions  NECK: no adenopathy, no asymmetry, masses, or scars and thyroid normal to palpation  RESP: lungs clear to auscultation - no rales, rhonchi or wheezes  Tremor R>L fine  Tremor chin  CV: regular rate and rhythm, normal S1 S2, no S3 or S4, no murmur, click or rub, no peripheral edema and peripheral pulses strong  ABDOMEN: soft, nontender, no hepatosplenomegaly, no masses and bowel sounds normal  MS: no gross musculoskeletal defects noted, no  edema  SKIN: no suspicious lesions or rashes  NEURO: Normal strength and tone, mentation intact and speech normal  PSYCH: mentation appears normal, affect normal/bright  LYMPH: no cervical, supraclavicular, axillary, or inguinal adenopathy    Diagnostic Test Results:  Results for orders placed or performed during the hospital encounter of 10/05/17   MAMMO -  Screening Digital Bilateral ([facility]UTURE/[facility])    Narrative    Examination: Bilateral digital screening mammography with computer  aided detection, 10/5/2017 2:33 PM.    Comparison: None    History: No current breast concerns.    BREAST DENSITY: Scattered fibroglandular densities.    COMMENTS:  No suspicious finding.      Impression    IMPRESSION: BI-RADS CATEGORY: 1 -  NEGATIVE.    RECOMMENDED FOLLOW-UP: Annual Mammography.      The patient will be notified of the results.     ZAFAR STEVEN MD       ASSESSMENT/PLAN:     ASSESSMENT / PLAN:  (G25.0) Essential tremor  Comment: stop short active propranolol  Plan: gabapentin (NEURONTIN) 300 MG capsule,         propranolol (INDERAL LA) 60 MG 24 hr capsule        Schedule with neurology    Elevated BP  Continue to follow    Patient Instructions   Gabapentin 300 mg am/and 300 mg noon and 600 mg bedtime . New dose  Stop short acting propranolol 60 mg LA and start extended release at bedtime.    F/u 2 weeks  Monitor blood pressure and pulse    Schedule with neurology          Karin Lundberg MD  [facility]

## 2017-12-04 ENCOUNTER — OFFICE VISIT (OUTPATIENT)
Dept: FAMILY MEDICINE | Facility: CLINIC | Age: 62
End: 2017-12-04

## 2017-12-04 VITALS
SYSTOLIC BLOOD PRESSURE: 148 MMHG | HEART RATE: 74 BPM | BODY MASS INDEX: 44.63 KG/M2 | WEIGHT: 260 LBS | DIASTOLIC BLOOD PRESSURE: 86 MMHG | RESPIRATION RATE: 16 BRPM | OXYGEN SATURATION: 98 % | TEMPERATURE: 98.2 F

## 2017-12-04 DIAGNOSIS — G25.0 ESSENTIAL TREMOR: ICD-10-CM

## 2017-12-04 PROCEDURE — 99214 OFFICE O/P EST MOD 30 MIN: CPT | Performed by: FAMILY MEDICINE

## 2017-12-04 RX ORDER — MULTIVIT-MIN/IRON/FOLIC ACID/K 18-600-40
CAPSULE ORAL
COMMUNITY

## 2017-12-04 RX ORDER — PROPRANOLOL HCL 60 MG
60 CAPSULE, EXTENDED RELEASE 24HR ORAL DAILY
Qty: 14 CAPSULE | Refills: 0 | Status: SHIPPED | OUTPATIENT
Start: 2017-12-04 | End: 2017-12-18

## 2017-12-04 RX ORDER — GABAPENTIN 300 MG/1
CAPSULE ORAL
Qty: 120 CAPSULE | Refills: 3 | Status: SHIPPED | OUTPATIENT
Start: 2017-12-04 | End: 2017-12-18

## 2017-12-04 NOTE — PATIENT INSTRUCTIONS
Gabapentin 300 mg am/and 300 mg noon and 600 mg bedtime . New dose  Stop short acting propranolol 60 mg LA and start extended release at bedtime.    F/u 2 weeks  Monitor blood pressure and pulse    Schedule with neurology

## 2017-12-04 NOTE — MR AVS SNAPSHOT
"              After Visit Summary   12/4/2017    Lucero Davis    MRN: [id number]           Patient Information     Date Of Birth          1955        Visit Information        Provider Department      12/4/2017 3:45 PM Karin Lundberg MD [facility]        Today's Diagnoses     Essential tremor          Care Instructions    Gabapentin 300 mg am/and 300 mg noon and 600 mg bedtime . New dose  Stop short acting propranolol 60 mg LA and start extended release at bedtime.    F/u 2 weeks  Monitor blood pressure and pulse    Schedule with neurology              Follow-ups after your visit        Who to contact     If you have questions or need follow up information about today's clinic visit or your schedule please contact [facility] directly at [phone].  Normal or non-critical lab and imaging results will be communicated to you by [employer], letter or phone within 4 business days after the clinic has received the results. If you do not hear from us within 7 days, please contact the clinic through [email or web address] or phone. If you have a critical or abnormal lab result, we will notify you by phone as soon as possible.  Submit refill requests through [employer] or call your pharmacy and they will forward the refill request to us. Please allow 3 business days for your refill to be completed.          Additional Information About Your Visit        [facility] [facility] [employer] Information     [employer] lets you send messages to your doctor, view your test results, renew your prescriptions, schedule appointments and more. To sign up, go to [email or web address] . Click on \"Log in\" on the left side of the screen, which will take you to the Welcome page. Then click on \"Sign up Now\" on the right side of the page.     You will be asked to enter the access code listed below, as well as some personal information. Please follow the directions to create your username and password.     Your access code is: [email or web address]  Expires: " 3/4/2018  4:11 PM     Your access code will  in 90 days. If you need help or a new code, please call your [facility] clinic or [phone].        Care EveryWhere ID     This is your Care EveryWhere ID. This could be used by other organizations to access your [facility] medical records  [id number]        Your Vitals Were     Pulse Temperature Respirations Pulse Oximetry BMI (Body Mass Index)       74 98.2  F (36.8  C) (Oral) 16 98% 44.63 kg/m2        Blood Pressure from Last 3 Encounters:   17 148/86   17 146/88   10/06/17 144/86    Weight from Last 3 Encounters:   17 117.9 kg (260 lb)   17 115.2 kg (254 lb)              Today, you had the following     No orders found for display         Today's Medication Changes          These changes are accurate as of: 17  4:11 PM.  If you have any questions, ask your nurse or doctor.               Start taking these medicines.        Dose/Directions    propranolol 60 MG 24 hr capsule   Commonly known as:  INDERAL LA   Used for:  Essential tremor   Replaces:  propranolol 20 MG tablet   Started by:  Karin Lundberg MD        Dose:  60 mg   Take 1 capsule (60 mg) by mouth daily   Quantity:  14 capsule   Refills:  0         These medicines have changed or have updated prescriptions.        Dose/Directions    gabapentin 300 MG capsule   Commonly known as:  NEURONTIN   This may have changed:  additional instructions   Used for:  Essential tremor   Changed by:  Karin Lundberg MD        300 mg am, 300 mg noon and 600 mg bedtime New increase   Quantity:  120 capsule   Refills:  3         Stop taking these medicines if you haven't already. Please contact your care team if you have questions.     propranolol 20 MG tablet   Commonly known as:  INDERAL   Replaced by:  propranolol 60 MG 24 hr capsule   Stopped by:  Karin Lundberg MD                Where to get your medicines      These medications were sent to [facility] [id number] -  [address] & [address], [place] [address]     Phone:  [phone]     propranolol 60 MG 24 hr capsule         Some of these will need a paper prescription and others can be bought over the counter.  Ask your nurse if you have questions.     Bring a paper prescription for each of these medications     gabapentin 300 MG capsule                Primary Care Provider Office Phone # Fax #    Karin Lundberg [phone] [address]  [facility] [address]        Equal Access to Services     [facility]: Hadii aad ku hadasho Soomaali, waaxda luqadaha, qaybta kaalmada adeegyada, carlos guerrero hayaan adeeddi archuleta . So [facility] [phone].    ATENCIÓN: Si habla español, tiene a adler disposición servicios gratuitos de asistencia lingüística. Llame al [phone].    We comply with applicable federal civil rights laws and [place] laws. We do not discriminate on the basis of race, color, national origin, age, disability, sex, sexual orientation, or gender identity.            Thank you!     Thank you for choosing [facility]  for your care. Our goal is always to provide you with excellent care. Hearing back from our patients is one way we can continue to improve our services. Please take a few minutes to complete the written survey that you may receive in the mail after your visit with us. Thank you!             Your Updated Medication List - Protect others around you: Learn how to safely use, store and throw away your medicines at www.disposemymeds.org.          This list is accurate as of: 12/4/17  4:11 PM.  Always use your most recent med list.                   Brand Name Dispense Instructions for use Diagnosis    gabapentin 300 MG capsule    NEURONTIN    120 capsule    300 mg am, 300 mg noon and 600 mg bedtime New increase    Essential tremor       MULTIVITAL PO       Nutritional deficiency       propranolol 60 MG 24  hr capsule    INDERAL LA    14 capsule    Take 1 capsule (60 mg) by mouth daily    Essential tremor       Vitamin D (Cholecalciferol) 1000 UNITS Tabs

## 2017-12-07 NOTE — PROGRESS NOTES
12/4/17 we faxed 12/4/17 and 11/3/17 office notes to Dr. Estrada @ [phone]    Fareed Gray,   [facility]  [phone]

## 2017-12-07 NOTE — PROGRESS NOTES
12/4/17 we faxed 12/4/17 and 11/3/17 office notes to Dr. Estrada @ [phone]    Fareed Gray,   [facility]  [phone]

## 2017-12-08 ENCOUNTER — TRANSFERRED RECORDS (OUTPATIENT)
Dept: FAMILY MEDICINE | Facility: CLINIC | Age: 62
End: 2017-12-08

## 2017-12-14 NOTE — PROGRESS NOTES
12/5/17 Received fax from [employer], they are suspending the cologuard order because of inactivity.  This order will be reactivated if the patient returns their sample within 365 days of the initial order date.    Fareed Gray   [facility]  [phone]      10/8/18 Received another fax from [employer].  The order is now cancelled because it has been 365 days since order was placed.    Fareed Gray,   [facility]  [phone]

## 2017-12-18 ENCOUNTER — OFFICE VISIT (OUTPATIENT)
Dept: FAMILY MEDICINE | Facility: CLINIC | Age: 62
End: 2017-12-18

## 2017-12-18 VITALS
SYSTOLIC BLOOD PRESSURE: 156 MMHG | RESPIRATION RATE: 22 BRPM | WEIGHT: 259.92 LBS | HEART RATE: 82 BPM | TEMPERATURE: 98.9 F | OXYGEN SATURATION: 97 % | BODY MASS INDEX: 44.62 KG/M2 | DIASTOLIC BLOOD PRESSURE: 86 MMHG

## 2017-12-18 DIAGNOSIS — G25.0 ESSENTIAL TREMOR: ICD-10-CM

## 2017-12-18 DIAGNOSIS — I10 BENIGN ESSENTIAL HYPERTENSION: Primary | ICD-10-CM

## 2017-12-18 PROCEDURE — 99214 OFFICE O/P EST MOD 30 MIN: CPT | Performed by: FAMILY MEDICINE

## 2017-12-18 RX ORDER — PROPRANOLOL HYDROCHLORIDE 80 MG/1
80 CAPSULE, EXTENDED RELEASE ORAL DAILY
Qty: 30 CAPSULE | Refills: 3 | Status: SHIPPED | OUTPATIENT
Start: 2017-12-18 | End: 2018-03-23

## 2017-12-18 RX ORDER — CARBIDOPA AND LEVODOPA 25; 100 MG/1; MG/1
TABLET ORAL
Refills: 11 | COMMUNITY
Start: 2017-12-08

## 2017-12-18 NOTE — PROGRESS NOTES
HCM:  PAP screen Q3- wants to wait   Colon Cancer Screen - received Cologuard kit, and is covered by insurance   Living Will - has information       Cc: FU Tremors   SUBJECTIVE:   Lucero Davis is a 62 year old female who presents to clinic today for the following health issues:    Patient saw Neurologist on 12/15/2017; DC gabapentin 300 mg per Neurologist, started sinemet  mg 12/16/2017. Scheduled for MRI today, but was unable to tolerated being in the MRI machine; reschedule for 12/29/2017. Patient signed CH[facility] to have medical records released to [facility].    Dr. Marla Estrada MD at Office of Neurology in [place].     Tremors       Duration: Follow- up    Description (location/character/radiation): Tremors have improved since the start of Sinemet  mg but is experiencing dry mouth    Intensity:  Mild to moderate    Accompanying signs and symptoms: see above     History (similar episodes/previous evaluation): Saw Dr. Estrada, Neurologist     Precipitating or alleviating factors: None    Therapies tried and outcome: see above      Saw neurology [facility] Livia started Sinemet 25mg/100 mg po TID and she thinks that is helping  Off the gabapentin since 12/8/17 without side effect  Still on propranolol 60 mg /24 hours  F/u January 23rd  MRI was suppose to do that and too claustrophobic. Rescheduled for 29th.   Picking up script at [facility]  Dr Estrada thought it was Parkinson's (Per patient report)    /86 (BP Location: Left arm, Patient Position: Sitting, Cuff Size: Adult Large)  Pulse 82  Temp 98.9  F (37.2  C) (Oral)  Resp 22  Wt 117.9 kg (259 lb 14.8 oz)  SpO2 97%  BMI 44.62 kg/m2     Wt Readings from Last 4 Encounters:   12/18/17 117.9 kg (259 lb 14.8 oz)   12/04/17 117.9 kg (260 lb)   09/05/17 115.2 kg (254 lb)     Advil 400 mg TID  Salt rare              Problem list and histories reviewed & adjusted, as indicated.  Additional history: as documented    Patient Active Problem  "List   Diagnosis     Tremor     Callus of foot     BMI 45.0-49.9, adult (H)     Osteopenia of both hips     Past Surgical History:   Procedure Laterality Date     EXTRACTION(S) DENTAL         Social History   Substance Use Topics     Smoking status: Never Smoker     Smokeless tobacco: Never Used     Alcohol use Yes      Comment: one can per month      Family History   Problem Relation Age of Onset     Prostate Cancer Father      Colon Cancer Maternal Grandmother      HEART DISEASE Maternal Grandfather      Colon Cancer Paternal Grandmother      Influenza/Pneumonia Paternal Grandfather          Current Outpatient Prescriptions   Medication Sig Dispense Refill     carbidopa-levodopa (SINEMET)  MG per tablet   11     propranolol (INDERAL LA) 80 MG 24 hr capsule Take 1 capsule (80 mg) by mouth daily 30 capsule 3     Vitamin D, Cholecalciferol, 1000 UNITS TABS        Multiple Vitamins-Minerals (MULTIVITAL PO)        [DISCONTINUED] propranolol (INDERAL LA) 60 MG 24 hr capsule Take 1 capsule (60 mg) by mouth daily 14 capsule 0     No Known Allergies  Recent Labs   Lab Test  09/05/17   1409   A1C  5.6   LDL  157*   HDL  61   TRIG  76   ALT  12   CR  0.69   POTASSIUM  4.0      BP Readings from Last 3 Encounters:   12/18/17 156/86   12/04/17 148/86   11/03/17 146/88    Wt Readings from Last 3 Encounters:   12/18/17 117.9 kg (259 lb 14.8 oz)   12/04/17 117.9 kg (260 lb)   09/05/17 115.2 kg (254 lb)       Estimated body mass index is 44.62 kg/(m^2) as calculated from the following:    Height as of 9/5/17: 1.626 m (5' 4\").    Weight as of this encounter: 117.9 kg (259 lb 14.8 oz).  Weight loss recommended           Labs reviewed in EPIC          Reviewed and updated as needed this visit by clinical staff     Reviewed and updated as needed this visit by Provider         ROS:  Constitutional, HEENT, cardiovascular, pulmonary, GI, , musculoskeletal, neuro, skin, endocrine and psych systems are negative, except as otherwise " noted.      Runny nose  Balance getting off bed/chair. Sits and then waits to move.    OBJECTIVE:   /86 (BP Location: Left arm, Patient Position: Sitting, Cuff Size: Adult Large)  Pulse 82  Temp 98.9  F (37.2  C) (Oral)  Resp 22  Wt 117.9 kg (259 lb 14.8 oz)  SpO2 97%  BMI 44.62 kg/m2  Body mass index is 44.62 kg/(m^2).  GENERAL: healthy, alert and no distress  EYES: Eyes grossly normal to inspection, PERRL and conjunctivae and sclerae normal  HENT: ear canals and TM's normal, nose and mouth without ulcers or lesions  NECK: no adenopathy, no asymmetry, masses, or scars and thyroid normal to palpation  RESP: lungs clear to auscultation - no rales, rhonchi or wheezes  CV: regular rate and rhythm, normal S1 S2, no S3 or S4, no murmur, click or rub, no peripheral edema and peripheral pulses strong  ABDOMEN: soft, nontender, no hepatosplenomegaly, no masses and bowel sounds normal  MS: no gross musculoskeletal defects noted, no edema  SKIN: no suspicious lesions or rashes  NEURO: Normal strength and tone, mentation intact and speech normal, tremor in R>L hands, no cogwheeling  PSYCH: mentation appears normal, affect normal/bright  LYMPH: no cervical, supraclavicular, axillary, or inguinal adenopathy    Diagnostic Test Results:  Results for orders placed or performed during the hospital encounter of 10/05/17   MAMMO -  Screening Digital Bilateral (FUTURE/[facility] Breast [facility])    Narrative    Examination: Bilateral digital screening mammography with computer  aided detection, 10/5/2017 2:33 PM.    Comparison: None    History: No current breast concerns.    BREAST DENSITY: Scattered fibroglandular densities.    COMMENTS:  No suspicious finding.      Impression    IMPRESSION: BI-RADS CATEGORY: 1 -  NEGATIVE.    RECOMMENDED FOLLOW-UP: Annual Mammography.      The patient will be notified of the results.     ZAFAR STEVEN MD       ASSESSMENT/PLAN:     ASSESSMENT / PLAN:  (I10) Benign essential hypertension  (primary  encounter diagnosis)  Comment: OR parkinson's (having neurology work up)    Plan: propranolol (INDERAL LA) 80 MG 24 hr capsule        New increase    (G25.0) Essential tremor  Comment: as above  Plan: propranolol (INDERAL LA) 80 MG 24 hr capsule        New increase        Patient Instructions   F/u with neurology as planned  MRI as planned    Propranolol 24 hr 80 mg new dose for tremor and blood pressure    Monitor Blood pressure and pulse at home    Get up slowly    F/u Dr GOLD one month      Karin Lundberg MD  [facility]

## 2017-12-18 NOTE — PATIENT INSTRUCTIONS
F/u with neurology as planned  MRI as planned    Propranolol 24 hr 80 mg new dose for tremor and blood pressure    Monitor Blood pressure and pulse at home    Get up slowly    F/u Dr GOLD one month

## 2017-12-18 NOTE — MR AVS SNAPSHOT
"              After Visit Summary   12/18/2017    Lucero Davis    MRN: [id number]           Patient Information     Date Of Birth          1955        Visit Information        Provider Department      12/18/2017 3:45 PM Karin Lundberg MD [facility]        Today's Diagnoses     Benign essential hypertension    -  1    Essential tremor          Care Instructions    F/u with neurology as planned  MRI as planned    Propranolol 24 hr 80 mg new dose for tremor and blood pressure    Monitor Blood pressure and pulse at home    Get up slowly    F/u Dr GOLD one month          Follow-ups after your visit        Follow-up notes from your care team     Return in about 1 month (around 1/18/2018).      Who to contact     If you have questions or need follow up information about today's clinic visit or your schedule please contact [facility] directly at [phone].  Normal or non-critical lab and imaging results will be communicated to you by [employer], letter or phone within 4 business days after the clinic has received the results. If you do not hear from us within 7 days, please contact the clinic through [employer] or phone. If you have a critical or abnormal lab result, we will notify you by phone as soon as possible.  Submit refill requests through [employer] or call your pharmacy and they will forward the refill request to us. Please allow 3 business days for your refill to be completed.          Additional Information About Your Visit        MyChart Information     [employer] lets you send messages to your doctor, view your test results, renew your prescriptions, schedule appointments and more. To sign up, go to [email or web address] . Click on \"Log in\" on the left side of the screen, which will take you to the Welcome page. Then click on \"Sign up Now\" on the right side of the page.     You will be asked to enter the access code listed below, as well as some personal information. Please follow " the directions to create your username and password.     Your access code is: [email or web address]  Expires: 3/4/2018  4:11 PM     Your access code will  in 90 days. If you need help or a new code, please call your [place] clinic or [phone].        Care EveryWhere ID     This is your Care EveryWhere ID. This could be used by other organizations to access your [place] medical records  [id number]        Your Vitals Were     Pulse Temperature Respirations Pulse Oximetry BMI (Body Mass Index)       82 98.9  F (37.2  C) (Oral) 22 97% 44.62 kg/m2        Blood Pressure from Last 3 Encounters:   17 156/86   17 148/86   17 146/88    Weight from Last 3 Encounters:   17 117.9 kg (259 lb 14.8 oz)   17 117.9 kg (260 lb)   17 115.2 kg (254 lb)              Today, you had the following     No orders found for display         Today's Medication Changes          These changes are accurate as of: 17  4:06 PM.  If you have any questions, ask your nurse or doctor.               These medicines have changed or have updated prescriptions.        Dose/Directions    propranolol 80 MG 24 hr capsule   Commonly known as:  INDERAL LA   This may have changed:    - medication strength  - how much to take   Used for:  Essential tremor, Benign essential hypertension        Dose:  80 mg   Take 1 capsule (80 mg) by mouth daily   Quantity:  30 capsule   Refills:  3            Where to get your medicines      These medications were sent to [facility] [address] [place] [address] & [address], [place] [address]     Phone:  [phone]     propranolol 80 MG 24 hr capsule                Primary Care Provider Office Phone # Fax #    Karin Lundberg [phone] [address] [place] [address]        Equal Access to Services     YESSI MARCOS AH: Singh Howell, nirali eldridge, pascale blandon,  carlos soliseddi brianchet la'aan ah. So [facility] [phone].    ATENCIÓN: Si habla aline, tiene a adler disposición servicios gratuitos de asistencia lingüística. Vidal al [phone].    We comply with applicable federal civil rights laws and [place] laws. We do not discriminate on the basis of race, color, national origin, age, disability, sex, sexual orientation, or gender identity.            Thank you!     Thank you for choosing [facility]  for your care. Our goal is always to provide you with excellent care. Hearing back from our patients is one way we can continue to improve our services. Please take a few minutes to complete the written survey that you may receive in the mail after your visit with us. Thank you!             Your Updated Medication List - Protect others around you: Learn how to safely use, store and throw away your medicines at www.disposemymeds.org.          This list is accurate as of: 12/18/17  4:06 PM.  Always use your most recent med list.                   Brand Name Dispense Instructions for use Diagnosis    carbidopa-levodopa  MG per tablet    SINEMET          MULTIVITAL PO       Nutritional deficiency       propranolol 80 MG 24 hr capsule    INDERAL LA    30 capsule    Take 1 capsule (80 mg) by mouth daily    Essential tremor, Benign essential hypertension       Vitamin D (Cholecalciferol) 1000 UNITS Tabs

## 2018-01-21 ENCOUNTER — HEALTH MAINTENANCE LETTER (OUTPATIENT)
Age: 63
End: 2018-01-21

## 2018-03-22 NOTE — PROGRESS NOTES
White female with glasses and one point cane    Last seen 12/18/17    ASSESSMENT / PLAN:  (I10) Benign essential hypertension  (primary encounter diagnosis)  Comment: OR parkinson's (having neurology work up)     Plan: propranolol (INDERAL LA) 80 MG 24 hr capsule        New increase     (G25.0) Essential tremor  Comment: as above  Plan: propranolol (INDERAL LA) 80 MG 24 hr capsule        New increase           Patient Instructions   F/u with neurology as planned  MRI as planned     Propranolol 24 hr 80 mg new dose for tremor and blood pressure     Monitor Blood pressure and pulse at home     Get up slowly     F/u Dr GOLD one month      TODAY    Sleep ok  Appetite ok  Exercise limited walking cane from back of clinic to . Foot pain left.    Smoking no  ETOH no  Street drugs/MJ no  Caffeine 1/2 cup per day    Mood ok  Some days frustrating    Living will awaiting [address] [place] has but has not done that yet    Declined pap, no h/o abnormal. Fhx negative        F/u last visit increased propranolol to 80 mg 24 hr cap    BP Readings from Last 3 Encounters:   03/23/18 141/82   12/18/17 156/86   12/04/17 148/86   Home -170/80-90. Using wrist cuff. Change to arm cuff and recheck.   Sodium limited  HA/CP no  Dizzy lightheaded. When change position and taking her time. When walking turning might trigger it.  No PT  No Pool therapy    Neurology last seen December 8th notes reviewed  Dx at that time was Parkinsonism, Gait disorder and tremor  With DDx for hydrocephalus, space occupying lesion or Mike's disease. Labs were done then.  MRI claustophobia. Tried a different machine. Valium was order for another study but has not rescheduled.    Sinemet tremors better, wearing off. Taking TID.   F/u one month with neurology  Dry mouth is tolerated    Foot wants to turn out. Spasm. Toe points up. Neurological.L Brace insurance issues still in process.  Botox was not brought up with Neurology  Dytonia is mentioned in  "their note    Has not been to balance center     ROS: 10 point ROS neg other than the symptoms noted above in the HPI.  /82  Pulse 76  Resp 16  SpO2 97%   Estimated body mass index is 44.62 kg/(m^2) as calculated from the following:    Height as of 9/5/17: 1.626 m (5' 4\").    Weight as of 12/18/17: 117.9 kg (259 lb 14.8 oz).   Weight loss is recommended  Weight declined today by patient    Exam:  Constitutional: healthy, alert and no distress morbidly obese  Head: Normocephalic. No masses, lesions, tenderness or abnormalities  Neck: Neck supple. No adenopathy. Thyroid symmetric, normal size,, Carotids without bruits.  ENT: ENT exam normal, no neck nodes or sinus tenderness  Cardiovascular: negative, PMI normal. No lifts, heaves, or thrills. RRR. No murmurs, clicks gallops or rub  Respiratory: negative, Percussion normal. Good diaphragmatic excursion. Lungs clear  Gastrointestinal: Abdomen soft, non-tender. BS normal. No masses, organomegaly  : Deferred  Musculoskeletal: extremities normal- no gross deformities noted, gait normal and normal muscle tone  Skin: no suspicious lesions or rashes  Neurologic: Gait abnormal as before. Reflexes as before Sensation grossly WNL.(see neurology notes for details)  Left Foot in brace as before Tremors are slightly less pronounced then before.  Psychiatric: mentation appears normal and affect normal/bright  Hematologic/Lymphatic/Immunologic: Normal cervical lymph nodes    Last Basic Metabolic Panel:  Lab Results   Component Value Date     09/05/2017      Lab Results   Component Value Date    POTASSIUM 4.0 09/05/2017     Lab Results   Component Value Date    CHLORIDE 102 09/05/2017     Lab Results   Component Value Date    SHAKIRA 9.1 09/05/2017     No results found for: CO2  Lab Results   Component Value Date    BUN 16 09/05/2017    BUN 23 09/05/2017     Lab Results   Component Value Date    CR 0.69 09/05/2017     Lab Results   Component Value Date    GLC 90 " 09/05/2017         ASSESSMENT / PLAN:  (I10) Benign essential hypertension  (primary encounter diagnosis)  BP Readings from Last 3 Encounters:   03/23/18 141/82   12/18/17 156/86   12/04/17 148/86     Plan: propranolol (INDERAL LA) 80 MG 24 hr capsule            (G20) Primary Parkinsonism (H)  Comment:   Plan: f/u with neurology continue sinement    (R25.1) Tremor  Comment:   Plan: f/u with neurology. Continue sinemet    (R26.9) Gait disorder  Comment:   Plan: per neurology    (Z68.42) BMI 45.0-49.9, adult (H)  Comment:   Plan: weight loss recommended    Karin Lundberg MD, MEd  [facility]

## 2018-03-23 ENCOUNTER — OFFICE VISIT (OUTPATIENT)
Dept: FAMILY MEDICINE | Facility: CLINIC | Age: 63
End: 2018-03-23

## 2018-03-23 VITALS
HEART RATE: 76 BPM | OXYGEN SATURATION: 97 % | DIASTOLIC BLOOD PRESSURE: 82 MMHG | RESPIRATION RATE: 16 BRPM | SYSTOLIC BLOOD PRESSURE: 141 MMHG

## 2018-03-23 DIAGNOSIS — I10 BENIGN ESSENTIAL HYPERTENSION: Primary | ICD-10-CM

## 2018-03-23 DIAGNOSIS — E66.01 MORBID OBESITY (H): ICD-10-CM

## 2018-03-23 DIAGNOSIS — R25.1 TREMOR: ICD-10-CM

## 2018-03-23 DIAGNOSIS — G20.C PRIMARY PARKINSONISM (H): ICD-10-CM

## 2018-03-23 DIAGNOSIS — R26.9 GAIT DISORDER: ICD-10-CM

## 2018-03-23 PROCEDURE — 99214 OFFICE O/P EST MOD 30 MIN: CPT | Performed by: FAMILY MEDICINE

## 2018-03-23 RX ORDER — PROPRANOLOL HYDROCHLORIDE 80 MG/1
80 CAPSULE, EXTENDED RELEASE ORAL DAILY
Qty: 30 CAPSULE | Refills: 3 | Status: SHIPPED | OUTPATIENT
Start: 2018-03-23 | End: 2019-10-04

## 2018-03-23 NOTE — Clinical Note
Dr porter [place] [facility] Please call their office about: Sinemet wearing off. Taking first dose 8 am, 2 pm and bedtime. 1 pm is wearing off. Do they want her to increase from TID to QID? She is wondering about the brace that was proposed for her left foot. Has payment been approved. And wondering if botox might help her left foot.  Thanks.

## 2018-03-23 NOTE — MR AVS SNAPSHOT
"              After Visit Summary   3/23/2018    Lucero Davis    MRN: [id number]           Patient Information     Date Of Birth          1955        Visit Information        Provider Department      3/23/2018 9:00 AM Karin Lundberg MD [facility]        Today's Diagnoses     Essential tremor        Benign essential hypertension          Care Instructions    Handicapped parking form    Send in your cologuard    Get your living will notary done and return for scanning    RN will call Neurology with your questions.    Dizzy balance center              Follow-ups after your visit        Who to contact     If you have questions or need follow up information about today's clinic visit or your schedule please contact [facility] directly at [phone].  Normal or non-critical lab and imaging results will be communicated to you by [employer], letter or phone within 4 business days after the clinic has received the results. If you do not hear from us within 7 days, please contact the clinic through [employer] or phone. If you have a critical or abnormal lab result, we will notify you by phone as soon as possible.  Submit refill requests through [employer] or call your pharmacy and they will forward the refill request to us. Please allow 3 business days for your refill to be completed.          Additional Information About Your Visit        [employer] Information     [employer] lets you send messages to your doctor, view your test results, renew your prescriptions, schedule appointments and more. To sign up, go to [email or web address] . Click on \"Log in\" on the left side of the screen, which will take you to the Welcome page. Then click on \"Sign up Now\" on the right side of the page.     You will be asked to enter the access code listed below, as well as some personal information. Please follow the directions to create your username and password.     Your access code is: [email or web address]  Expires: " 2018 10:00 AM     Your access code will  in 90 days. If you need help or a new code, please call your [place] clinic or [phone].        Care EveryWhere ID     This is your Care EveryWhere ID. This could be used by other organizations to access your [facility] medical records  [id number]        Your Vitals Were     Pulse Respirations Pulse Oximetry             76 16 97%          Blood Pressure from Last 3 Encounters:   18 141/82   17 156/86   17 148/86    Weight from Last 3 Encounters:   17 117.9 kg (259 lb 14.8 oz)   17 117.9 kg (260 lb)   17 115.2 kg (254 lb)              Today, you had the following     No orders found for display         Where to get your medicines      These medications were sent to [facility] [address] [place] [address] & [address], [place] [address]     Phone:  [phone]     propranolol 80 MG 24 hr capsule          Primary Care Provider Office Phone # Fax #    Karin Lundberg [phone] [address]  [facility] [address]        Equal Access to Services     YESSI MARCOS : Hadii aad ku hadasho Soomaali, waaxda luqadaha, qaybta kaalmada adeegyada, waxay idiin hayarmandon amada archuleta . So [facility] [phone].    ATENCIÓN: Si habla español, tiene a adler disposición servicios gratuitos de asistencia lingüística. ame al [phone].    We comply with applicable federal civil rights laws and [place] laws. We do not discriminate on the basis of race, color, national origin, age, disability, sex, sexual orientation, or gender identity.            Thank you!     Thank you for choosing [facility]  for your care. Our goal is always to provide you with excellent care. Hearing back from our patients is one way we can continue to improve our services. Please take a few minutes to complete the written survey that you may receive in the mail after  your visit with us. Thank you!             Your Updated Medication List - Protect others around you: Learn how to safely use, store and throw away your medicines at www.disposemymeds.org.          This list is accurate as of 3/23/18 10:00 AM.  Always use your most recent med list.                   Brand Name Dispense Instructions for use Diagnosis    carbidopa-levodopa  MG per tablet    SINEMET          MULTIVITAL PO       Nutritional deficiency       propranolol 80 MG 24 hr capsule    INDERAL LA    30 capsule    Take 1 capsule (80 mg) by mouth daily    Essential tremor, Benign essential hypertension       Vitamin D (Cholecalciferol) 1000 UNITS Tabs

## 2018-03-23 NOTE — PATIENT INSTRUCTIONS
Handicapped parking form    Send in your cologuard    Get your living will notary done and return for scanning    RN will call Neurology with your questions.    Dizzy balance center

## 2018-03-26 ENCOUNTER — TRANSFERRED RECORDS (OUTPATIENT)
Dept: FAMILY MEDICINE | Facility: CLINIC | Age: 63
End: 2018-03-26

## 2018-03-26 NOTE — PROGRESS NOTES
Karin Lundberg MD  [facility]                     Dr porter [place] [facility]   Please call their office about:   Sinemet wearing off. Taking first dose 8 am, 2 pm and bedtime. 1 pm is wearing off. Do they want her to increase from TID to QID?   She is wondering about the brace that was proposed for her left foot. Has payment been approved. And wondering if botox might help her left foot.   Thanks.       Called Dr. Porter's coordinator, Marixa, with above issues from patient's 3/23 visit with Dr. Lundberg. Patient was seen for first time with them 12/2017 and was to follow up in 1 month. Marixa will call patient to set up visit in next 1-2 weeks with Dr. Lundberg.   Nia Garza RN

## 2018-03-29 PROBLEM — R26.9 GAIT DISORDER: Status: ACTIVE | Noted: 2018-03-29

## 2018-03-29 PROBLEM — G25.0 ESSENTIAL TREMOR: Status: RESOLVED | Noted: 2018-03-29 | Resolved: 2018-03-29

## 2018-03-29 PROBLEM — G25.0 ESSENTIAL TREMOR: Status: ACTIVE | Noted: 2018-03-29

## 2018-03-29 PROBLEM — G20.C PRIMARY PARKINSONISM (H): Status: ACTIVE | Noted: 2018-03-29

## 2018-04-30 ENCOUNTER — TRANSFERRED RECORDS (OUTPATIENT)
Dept: FAMILY MEDICINE | Facility: CLINIC | Age: 63
End: 2018-04-30

## 2018-05-22 ENCOUNTER — TRANSFERRED RECORDS (OUTPATIENT)
Dept: FAMILY MEDICINE | Facility: CLINIC | Age: 63
End: 2018-05-22

## 2018-06-20 ENCOUNTER — TRANSFERRED RECORDS (OUTPATIENT)
Dept: FAMILY MEDICINE | Facility: CLINIC | Age: 63
End: 2018-06-20

## 2018-07-10 ENCOUNTER — TRANSFERRED RECORDS (OUTPATIENT)
Dept: FAMILY MEDICINE | Facility: CLINIC | Age: 63
End: 2018-07-10

## 2018-07-13 ENCOUNTER — HOSPITAL ENCOUNTER (OUTPATIENT)
Dept: CT IMAGING | Facility: CLINIC | Age: 63
Discharge: HOME | End: 2018-07-13
Attending: PSYCHIATRY & NEUROLOGY | Admitting: PSYCHIATRY & NEUROLOGY
Payer: COMMERCIAL

## 2018-07-13 DIAGNOSIS — G20.C: ICD-10-CM

## 2018-07-13 DIAGNOSIS — R26.9 GAIT DISORDER: ICD-10-CM

## 2018-07-13 DIAGNOSIS — M79.605 LEFT LEG PAIN: ICD-10-CM

## 2018-07-13 PROCEDURE — 70450 CT HEAD/BRAIN W/O DYE: CPT

## 2019-10-04 ENCOUNTER — OFFICE VISIT (OUTPATIENT)
Dept: FAMILY MEDICINE | Facility: CLINIC | Age: 64
End: 2019-10-04

## 2019-10-04 VITALS
DIASTOLIC BLOOD PRESSURE: 108 MMHG | RESPIRATION RATE: 16 BRPM | OXYGEN SATURATION: 97 % | HEART RATE: 85 BPM | SYSTOLIC BLOOD PRESSURE: 170 MMHG

## 2019-10-04 DIAGNOSIS — Z23 ENCOUNTER FOR IMMUNIZATION: ICD-10-CM

## 2019-10-04 DIAGNOSIS — I10 BENIGN ESSENTIAL HYPERTENSION: ICD-10-CM

## 2019-10-04 DIAGNOSIS — E78.5 HYPERLIPIDEMIA LDL GOAL <100: Primary | ICD-10-CM

## 2019-10-04 LAB
% GRANULOCYTES: 68.7 % (ref 42.2–75.2)
HCT VFR BLD AUTO: 41.1 % (ref 35–46)
HEMOGLOBIN: 13.6 G/DL (ref 11.8–15.5)
LYMPHOCYTES NFR BLD AUTO: 24.2 % (ref 20.5–51.1)
MCH RBC QN AUTO: 29.6 PG (ref 27–31)
MCHC RBC AUTO-ENTMCNC: 33.2 G/DL (ref 33–37)
MCV RBC AUTO: 89.2 FL (ref 80–100)
MONOCYTES NFR BLD AUTO: 7.1 % (ref 1.7–9.3)
PLATELET # BLD AUTO: 243 K/UL (ref 140–450)
RBC # BLD AUTO: 4.6 X10/CMM (ref 3.7–5.2)
WBC # BLD AUTO: 4.5 X10/CMM (ref 3.8–11)

## 2019-10-04 PROCEDURE — 36415 COLL VENOUS BLD VENIPUNCTURE: CPT | Performed by: FAMILY MEDICINE

## 2019-10-04 PROCEDURE — 90471 IMMUNIZATION ADMIN: CPT | Performed by: FAMILY MEDICINE

## 2019-10-04 PROCEDURE — 85025 COMPLETE CBC W/AUTO DIFF WBC: CPT | Performed by: FAMILY MEDICINE

## 2019-10-04 PROCEDURE — 90686 IIV4 VACC NO PRSV 0.5 ML IM: CPT | Performed by: FAMILY MEDICINE

## 2019-10-04 PROCEDURE — 99213 OFFICE O/P EST LOW 20 MIN: CPT | Mod: 25 | Performed by: FAMILY MEDICINE

## 2019-10-04 RX ORDER — LISINOPRIL 2.5 MG/1
2.5 TABLET ORAL DAILY
Qty: 30 TABLET | Refills: 0 | Status: SHIPPED | OUTPATIENT
Start: 2019-10-04 | End: 2019-10-18 | Stop reason: DRUGHIGH

## 2019-10-04 ASSESSMENT — PATIENT HEALTH QUESTIONNAIRE - PHQ9
SUM OF ALL RESPONSES TO PHQ QUESTIONS 1-9: 6
5. POOR APPETITE OR OVEREATING: SEVERAL DAYS

## 2019-10-04 ASSESSMENT — ANXIETY QUESTIONNAIRES
GAD7 TOTAL SCORE: 6
IF YOU CHECKED OFF ANY PROBLEMS ON THIS QUESTIONNAIRE, HOW DIFFICULT HAVE THESE PROBLEMS MADE IT FOR YOU TO DO YOUR WORK, TAKE CARE OF THINGS AT HOME, OR GET ALONG WITH OTHER PEOPLE: SOMEWHAT DIFFICULT
1. FEELING NERVOUS, ANXIOUS, OR ON EDGE: SEVERAL DAYS
3. WORRYING TOO MUCH ABOUT DIFFERENT THINGS: SEVERAL DAYS
5. BEING SO RESTLESS THAT IT IS HARD TO SIT STILL: NOT AT ALL
6. BECOMING EASILY ANNOYED OR IRRITABLE: SEVERAL DAYS
2. NOT BEING ABLE TO STOP OR CONTROL WORRYING: SEVERAL DAYS
7. FEELING AFRAID AS IF SOMETHING AWFUL MIGHT HAPPEN: SEVERAL DAYS

## 2019-10-04 NOTE — PATIENT INSTRUCTIONS
Please follow up in two weeks for a blood pressure recheck    Patient Education     Lisinopril Oral tablet  What is this medicine?  LISINOPRIL (lyse IN oh pril) is an ACE inhibitor. This medicine is used to treat high blood pressure and heart failure. It is also used to protect the heart immediately after a heart attack.  This medicine may be used for other purposes; ask your health care provider or pharmacist if you have questions.  What should I tell my health care provider before I take this medicine?  They need to know if you have any of these conditions:    diabetes    heart or blood vessel disease    immune system disease like lupus or scleroderma    kidney disease    low blood pressure    previous swelling of the tongue, face, or lips with difficulty breathing, difficulty swallowing, hoarseness, or tightening of the throat    an unusual or allergic reaction to lisinopril, other ACE inhibitors, insect venom, foods, dyes, or preservatives    pregnant or trying to get pregnant    breast-feeding  How should I use this medicine?  Take this medicine by mouth with a glass of water. Follow the directions on your prescription label. You may take this medicine with or without food. Take your medicine at regular intervals. Do not stop taking this medicine except on the advice of your doctor or health care professional.  Talk to your pediatrician regarding the use of this medicine in children. Special care may be needed. While this drug may be prescribed for children as young as 6 years of age for selected conditions, precautions do apply.  Overdosage: If you think you have taken too much of this medicine contact a poison control center or emergency room at once.  NOTE: This medicine is only for you. Do not share this medicine with others.  What if I miss a dose?  If you miss a dose, take it as soon as you can. If it is almost time for your next dose, take only that dose. Do not take double or extra doses.  What may  interact with this medicine?    diuretics    lithium    NSAIDs, medicines for pain and inflammation, like ibuprofen or naproxen    over-the-counter herbal supplements like hawthorn    potassium salts or potassium supplements    salt substitutes  This list may not describe all possible interactions. Give your health care provider a list of all the medicines, herbs, non-prescription drugs, or dietary supplements you use. Also tell them if you smoke, drink alcohol, or use illegal drugs. Some items may interact with your medicine.  What should I watch for while using this medicine?  Visit your doctor or health care professional for regular check ups. Check your blood pressure as directed. Ask your doctor what your blood pressure should be, and when you should contact him or her. Call your doctor or health care professional if you notice an irregular or fast heart beat.  Women should inform their doctor if they wish to become pregnant or think they might be pregnant. There is a potential for serious side effects to an unborn child. Talk to your health care professional or pharmacist for more information.  Check with your doctor or health care professional if you get an attack of severe diarrhea, nausea and vomiting, or if you sweat a lot. The loss of too much body fluid can make it dangerous for you to take this medicine.  You may get drowsy or dizzy. Do not drive, use machinery, or do anything that needs mental alertness until you know how this drug affects you. Do not stand or sit up quickly, especially if you are an older patient. This reduces the risk of dizzy or fainting spells. Alcohol can make you more drowsy and dizzy. Avoid alcoholic drinks.  Avoid salt substitutes unless you are told otherwise by your doctor or health care professional.  Do not treat yourself for coughs, colds, or pain while you are taking this medicine without asking your doctor or health care professional for advice. Some ingredients may  increase your blood pressure.  What side effects may I notice from receiving this medicine?  Side effects that you should report to your doctor or health care professional as soon as possible:    abdominal pain with or without nausea or vomiting    allergic reactions like skin rash or hives, swelling of the hands, feet, face, lips, throat, or tongue    dark urine    difficulty breathing    dizzy, lightheaded or fainting spell    fever or sore throat    irregular heart beat, chest pain    pain or difficulty passing urine    redness, blistering, peeling or loosening of the skin, including inside the mouth    unusually weak    yellowing of the eyes or skin  Side effects that usually do not require medical attention (report to your doctor or health care professional if they continue or are bothersome):    change in taste    cough    decreased sexual function or desire    headache    sun sensitivity    tiredness  This list may not describe all possible side effects. Call your doctor for medical advice about side effects. You may report side effects to FDA at 6-608-FDA-8149.  Where should I keep my medicine?  Keep out of the reach of children.  Store at room temperature between 15 and 30 degrees C (59 and 86 degrees F). Protect from moisture. Keep container tightly closed. Throw away any unused medicine after the expiration date.  NOTE:This sheet is a summary. It may not cover all possible information. If you have questions about this medicine, talk to your doctor, pharmacist, or health care provider. Copyright  2016 Gold Standard

## 2019-10-04 NOTE — PROGRESS NOTES
Problem(s) Oriented visit        SUBJECTIVE:                                                    Lucero Davis is a 64 year old female who presents to clinic today for the following health issues :  Lucero is here to follow up about her blood pressure. She stopped taking propranolol about one year ago due to dizziness and has not followed up since discontinuing that time. Today her blood pressure is  170/108 (repeated testing). She takes her blood pressure at home and reports it is usually 150-140 / 92-85.    Hyperlipidemia Follow-Up      Are you having any of the following symptoms? (Select all that apply)  No complaints of shortness of breath, chest pain or pressure.  No increased sweating or nausea with activity.  No left-sided neck or arm pain.  No complaints of pain in calves when walking 1-2 blocks.    Are you regularly taking any medication or supplement to lower your cholesterol?   No    Are you having muscle aches or other side effects that you think could be caused by your cholesterol lowering medication?  No      Hypertension Follow-up      Do you check your blood pressure regularly outside of the clinic? Yes     Are you following a low salt diet? Yes    Are your blood pressures ever more than 140 on the top number (systolic) OR more   than 90 on the bottom number (diastolic), for example 140/90? Yes      How many servings of fruits and vegetables do you eat daily?  2-3    On average, how many sweetened beverages do you drink each day (soda, juice, sweet tea, etc)?   0    How many days per week do you miss taking your medication? 0          Problem list, Medication list, Allergies, and Medical/Social/Surgical histories reviewed in EPIC and updated as appropriate.   Additional history: as documented    ROS:  General:  NEGATIVE for fever, chills, change in weight CV:  NEGATIVE for chest pain, palpitations or peripheral edema Resp:  NEGATIVE for significant cough or SOB Musculoskeletal:  POSITIVE  for:,  muscle spasm extremities, muscle weakness  Neurologic: POSITIVE for:, balance problems, coordination, gait disturbance, dizziness/lightheadedness Psychiatric: POSITIVE for:, anxiety    Histories:   Patient Active Problem List   Diagnosis     Tremor     Callus of foot     BMI 45.0-49.9, adult (H)     Osteopenia of both hips     Gait disorder     Primary Parkinsonism (H)     Past Surgical History:   Procedure Laterality Date     EXTRACTION(S) DENTAL         Social History     Tobacco Use     Smoking status: Never Smoker     Smokeless tobacco: Never Used   Substance Use Topics     Alcohol use: Yes     Comment: one can per month      Family History   Problem Relation Age of Onset     Prostate Cancer Father      Colon Cancer Maternal Grandmother      Heart Disease Maternal Grandfather      Colon Cancer Paternal Grandmother      Influenza/Pneumonia Paternal Grandfather            OBJECTIVE:                                                    BP (!) 170/108   Pulse 85   Resp 16   SpO2 97%   There is no height or weight on file to calculate BMI.   GENERAL: healthy, alert and no distress  EYES: Eyes grossly normal to inspection, PERRL and conjunctivae and sclerae normal  RESP: lungs clear to auscultation - no rales, rhonchi or wheezes  CV: regular rate and rhythm, normal S1 S2, no S3 or S4, no murmur, click or rub, no peripheral edema and peripheral pulses strong  ABDOMEN: soft, nontender, no hepatosplenomegaly, no masses and bowel sounds normal  MS: Parkinson's disease with deformity of the left lower limb, muscle spasitiy   SKIN: no suspicious lesions or rashes  NEURO: Normal strength and tone, mentation intact and speech normal  PSYCH: mentation appears normal, affect normal/bright     ASSESSMENT/PLAN:                                                      Lucero was seen today for recheck medication.    Diagnoses and all orders for this visit:    Hyperlipidemia LDL goal <100  -     Lipid Panel ([facility])    Benign  essential hypertension  -     lisinopril (PRINIVIL/ZESTRIL) 2.5 MG tablet; Take 1 tablet (2.5 mg) by mouth daily  -     CBC with Diff/Plt ([facility])  -     Comp. Metabolic Panel (14) ([facility])    Encounter for immunization  -     INFLUENZA VACCINE IM 4YRS+ 4 VALENT CCIIV4  -     ADMIN 1st VACCINE    Please follow up in two weeks for a blood pressure recheck      ASSESSMENT/PLAN:       The following health maintenance items are reviewed in Epic and correct as of today:  Health Maintenance   Topic Date Due     ADVANCE CARE PLANNING  1955     COLONOSCOPY  07/29/1966     HIV SCREENING  07/29/1971     HPV  07/29/1977     PAP  07/29/1981     ZOSTER IMMUNIZATION (1 of 2) 07/29/2006     PREVENTIVE CARE VISIT  09/05/2018     PHQ-2  01/01/2019     INFLUENZA VACCINE (1) 09/01/2019     MAMMO SCREENING  10/05/2019     LIPID  09/05/2022     DTAP/TDAP/TD IMMUNIZATION (2 - Td) 09/05/2027     HEPATITIS C SCREENING  Completed     IPV IMMUNIZATION  Aged Out     MENINGITIS IMMUNIZATION  Aged Out       Felecia Cantu NP  [facility]  Family Practice  [facility]  [phone]    For any issues my office # is [phone]

## 2019-10-04 NOTE — LETTER
[place] [id number] [place]  [address]  Phone: [phone]    October 7, 2019      Lucero Davis  [address] [facility] [address]              Dear Lucero,    The results from your recent visit showed Your cholesterol and LDL cholesterol are elevated. I would like you to increase fiber and vegetables in your diet and get exercise at least three days a week. Walking is great exercise. Let's recheck these labs in one year after you have had a chance to make these changes. Please call if you would like to discuss these labs further.        Sincerely,     Felecia Cantu NP    Results for orders placed or performed in visit on 10/04/19   CBC with Diff/Plt ([facility])   Result Value Ref Range    WBC x10/cmm 4.5 3.8 - 11.0 x10/cmm    % Lymphocytes 24.2 20.5 - 51.1 %    % Monocytes 7.1 1.7 - 9.3 %    % Granulocytes 68.7 42.2 - 75.2 %    RBC x10/cmm 4.60 3.7 - 5.2 x10/cmm    Hemoglobin 13.6 11.8 - 15.5 g/dl    Hematocrit 41.1 35 - 46 %    MCV 89.2 80 - 100 fL    MCH 29.6 27.0 - 31.0 pg    MCHC 33.2 33.0 - 37.0 g/dL    Platelet Count 243 140 - 450 K/uL   Comp. Metabolic Panel (14) ([facility])   Result Value Ref Range    Glucose 94 65 - 99 mg/dL    Urea Nitrogen 21 8 - 27 mg/dL    Creatinine 0.77 0.57 - 1.00 mg/dL    eGFR If NonAfricn Am 82 >59 mL/min/1.73    eGFR If Africn Am 95 >59 mL/min/1.73    BUN/Creatinine Ratio 27 12 - 28    Sodium 143 134 - 144 mmol/L    Potassium 4.2 3.5 - 5.2 mmol/L    Chloride 103 96 - 106 mmol/L    Total CO2 27 20 - 29 mmol/L    Calcium 9.3 8.7 - 10.3 mg/dL    Protein Total 6.5 6.0 - 8.5 g/dL    Albumin 4.1 3.6 - 4.8 g/dL    Globulin, Total 2.4 1.5 - 4.5 g/dL    A/G Ratio 1.7 1.2 - 2.2    Bilirubin Total 0.5 0.0 - 1.2 mg/dL    Alkaline Phosphatase 89 39 - 117 IU/L    AST 17 0 - 40 IU/L    ALT 7 0 - 32 IU/L    Narrative    Performed at:  01 - [facility] [address], [place], [place]  [id number]  : Artie Beckett MD, Phone:  [phone]   Lipid  Panel ([facility])   Result Value Ref Range    Cholesterol 219 (H) 100 - 199 mg/dL    Triglycerides 78 0 - 149 mg/dL    HDL Cholesterol 59 >39 mg/dL    VLDL Cholesterol Bo 16 5 - 40 mg/dL    LDL Cholesterol Calculated 144 (H) 0 - 99 mg/dL    LDL/HDL Ratio 2.4 0.0 - 3.2 ratio    Narrative    Performed at:  01 - [facility] [address], [place], [place]  [id number]  : Artie Beckett MD, Phone:  [phone]

## 2019-10-05 LAB
ALBUMIN SERPL-MCNC: 4.1 G/DL (ref 3.6–4.8)
ALBUMIN/GLOB SERPL: 1.7 {RATIO} (ref 1.2–2.2)
ALP SERPL-CCNC: 89 IU/L (ref 39–117)
ALT SERPL-CCNC: 7 IU/L (ref 0–32)
AST SERPL-CCNC: 17 IU/L (ref 0–40)
BILIRUB SERPL-MCNC: 0.5 MG/DL (ref 0–1.2)
BUN SERPL-MCNC: 21 MG/DL (ref 8–27)
BUN/CREATININE RATIO: 27 (ref 12–28)
CALCIUM SERPL-MCNC: 9.3 MG/DL (ref 8.7–10.3)
CHLORIDE SERPLBLD-SCNC: 103 MMOL/L (ref 96–106)
CHOLEST SERPL-MCNC: 219 MG/DL (ref 100–199)
CREAT SERPL-MCNC: 0.77 MG/DL (ref 0.57–1)
EGFR IF AFRICN AM: 95 ML/MIN/1.73
EGFR IF NONAFRICN AM: 82 ML/MIN/1.73
GLOBULIN, TOTAL: 2.4 G/DL (ref 1.5–4.5)
GLUCOSE SERPL-MCNC: 94 MG/DL (ref 65–99)
HDLC SERPL-MCNC: 59 MG/DL
LDL/HDL RATIO: 2.4 RATIO (ref 0–3.2)
LDLC SERPL CALC-MCNC: 144 MG/DL (ref 0–99)
POTASSIUM SERPL-SCNC: 4.2 MMOL/L (ref 3.5–5.2)
PROT SERPL-MCNC: 6.5 G/DL (ref 6–8.5)
SODIUM SERPL-SCNC: 143 MMOL/L (ref 134–144)
TOTAL CO2: 27 MMOL/L (ref 20–29)
TRIGL SERPL-MCNC: 78 MG/DL (ref 0–149)
VLDLC SERPL CALC-MCNC: 16 MG/DL (ref 5–40)

## 2019-10-05 ASSESSMENT — ANXIETY QUESTIONNAIRES: GAD7 TOTAL SCORE: 6

## 2019-10-18 ENCOUNTER — OFFICE VISIT (OUTPATIENT)
Dept: FAMILY MEDICINE | Facility: CLINIC | Age: 64
End: 2019-10-18

## 2019-10-18 VITALS — HEART RATE: 78 BPM | SYSTOLIC BLOOD PRESSURE: 162 MMHG | DIASTOLIC BLOOD PRESSURE: 88 MMHG | OXYGEN SATURATION: 98 %

## 2019-10-18 DIAGNOSIS — I10 ESSENTIAL HYPERTENSION: Primary | ICD-10-CM

## 2019-10-18 DIAGNOSIS — I10 BENIGN ESSENTIAL HYPERTENSION: ICD-10-CM

## 2019-10-18 PROCEDURE — 99213 OFFICE O/P EST LOW 20 MIN: CPT | Performed by: FAMILY MEDICINE

## 2019-10-18 RX ORDER — LISINOPRIL 5 MG/1
5 TABLET ORAL DAILY
Qty: 90 TABLET | Refills: 3 | Status: SHIPPED | OUTPATIENT
Start: 2019-10-18 | End: 2021-01-11

## 2019-10-18 NOTE — PATIENT INSTRUCTIONS
Please record your blood pressure daily for the next two weeks and call Nov 1st to let us know your numbers. If they are still high I will increase your dose of lisinopril.

## 2019-10-18 NOTE — PROGRESS NOTES
Problem(s) Oriented visit        SUBJECTIVE:                                                    Lucero Davis is a 64 year old female who presents to clinic today for the following health issues :    Lucero is here to follow up after starting lisinopril on 10/04/2019. She reports no cough, no edema and no other noticeable side effects. Blood pressure is still elevated she reports top number has been in the 150 and bottom number has been in the high 80s. Blood pressure today is 162/88.  Hypertension Follow-up      Do you check your blood pressure regularly outside of the clinic? Yes     Are you following a low salt diet? No    Are your blood pressures ever more than 140 on the top number (systolic) OR more   than 90 on the bottom number (diastolic), for example 140/90? Yes      How many servings of fruits and vegetables do you eat daily?  2-3    On average, how many sweetened beverages do you drink each day (soda, juice, sweet tea, etc)?   1    How many days per week do you miss taking your medication? 0        Problem list, Medication list, Allergies, and Medical/Social/Surgical histories reviewed in [facility] and updated as appropriate.   Additional history: as documented    ROS:  General:  NEGATIVE for fever, chills, change in weight CV:  NEGATIVE for chest pain, palpitations or peripheral edema Resp:  NEGATIVE for significant cough or SOB    Histories:   Patient Active Problem List   Diagnosis     Tremor     Callus of foot     BMI 45.0-49.9, adult (H)     Osteopenia of both hips     Gait disorder     Primary Parkinsonism (H)     Past Surgical History:   Procedure Laterality Date     EXTRACTION(S) DENTAL         Social History     Tobacco Use     Smoking status: Never Smoker     Smokeless tobacco: Never Used   Substance Use Topics     Alcohol use: Yes     Comment: one can per month      Family History   Problem Relation Age of Onset     Prostate Cancer Father      Colon Cancer Maternal Grandmother      Heart Disease  Maternal Grandfather      Colon Cancer Paternal Grandmother      Influenza/Pneumonia Paternal Grandfather            OBJECTIVE:                                                    BP (!) 162/88   Pulse 78   SpO2 98%   There is no height or weight on file to calculate BMI.   GENERAL: healthy, alert and no distress  RESP: lungs clear to auscultation - no rales, rhonchi or wheezes  CV: regular rate and rhythm, normal S1 S2, no S3 or S4, no murmur, click or rub, no peripheral edema and peripheral pulses strong  PSYCH: mentation appears normal, affect normal/bright     ASSESSMENT/PLAN:                                                        Lucero was seen today for recheck medication.    Diagnoses and all orders for this visit:    Essential hypertension  -     lisinopril (PRINIVIL/ZESTRIL) 5 MG tablet; Take 1 tablet (5 mg) by mouth daily  I have increased your lisinopril to 5 mg daily.    Please record your blood pressure daily for the next two weeks and call Nov 1st to let us know your numbers. If they are still high I will increase your dose of lisinopril.         ASSESSMENT/PLAN:       The following health maintenance items are reviewed in Epic and correct as of today:  Health Maintenance   Topic Date Due     ADVANCE CARE PLANNING  1955     COLONOSCOPY  07/29/1966     HIV SCREENING  07/29/1971     HPV  07/29/1977     PAP  07/29/1981     ZOSTER IMMUNIZATION (1 of 2) 07/29/2006     PREVENTIVE CARE VISIT  09/05/2018     MAMMO SCREENING  10/05/2019     LIPID  10/04/2024     DTAP/TDAP/TD IMMUNIZATION (2 - Td) 09/05/2027     HEPATITIS C SCREENING  Completed     PHQ-2  Completed     INFLUENZA VACCINE  Completed     IPV IMMUNIZATION  Aged Out     MENINGITIS IMMUNIZATION  Aged Out       Felecia Cantu NP  [facility]  Family Practice  [facility]  [phone]    For any issues my office # is [phone]

## 2019-12-23 ENCOUNTER — TRANSFERRED RECORDS (OUTPATIENT)
Dept: FAMILY MEDICINE | Facility: CLINIC | Age: 64
End: 2019-12-23

## 2020-10-02 ENCOUNTER — TRANSFERRED RECORDS (OUTPATIENT)
Dept: FAMILY MEDICINE | Facility: CLINIC | Age: 65
End: 2020-10-02

## 2021-01-11 DIAGNOSIS — I10 ESSENTIAL HYPERTENSION: ICD-10-CM

## 2021-01-11 NOTE — TELEPHONE ENCOUNTER
LISNOPRIL  LOV (BP) 10/18/19  LAST LABS 10/4/19    DUE FOR MED CHECK/CPX & FASTING LABS - ESTABLISH CARE WITH NEW PCP    BP Readings from Last 3 Encounters:   10/18/19 (!) 162/88   10/04/19 (!) 170/108   03/23/18 141/82       Last Comprehensive Metabolic Panel:  Sodium   Date Value Ref Range Status   10/04/2019 143 134 - 144 mmol/L Final     Potassium   Date Value Ref Range Status   10/04/2019 4.2 3.5 - 5.2 mmol/L Final     Chloride   Date Value Ref Range Status   10/04/2019 103 96 - 106 mmol/L Final     Glucose   Date Value Ref Range Status   10/04/2019 94 65 - 99 mg/dL Final     Urea Nitrogen   Date Value Ref Range Status   10/04/2019 21 8 - 27 mg/dL Final     BUN/Creatinine Ratio   Date Value Ref Range Status   10/04/2019 27 12 - 28 Final     Creatinine   Date Value Ref Range Status   10/04/2019 0.77 0.57 - 1.00 mg/dL Final     Calcium   Date Value Ref Range Status   10/04/2019 9.3 8.7 - 10.3 mg/dL Final

## 2021-01-12 RX ORDER — LISINOPRIL 5 MG/1
5 TABLET ORAL DAILY
Qty: 90 TABLET | Refills: 0 | Status: SHIPPED | OUTPATIENT
Start: 2021-01-12

## 2021-01-19 NOTE — TELEPHONE ENCOUNTER
LMTCB--patient due for labs and office visit.    Fareed Gray,   [facility]  [phone]

## 2021-02-18 ENCOUNTER — HOSPITAL ENCOUNTER (EMERGENCY)
Facility: CLINIC | Age: 66
Discharge: HOME | End: 2021-02-18
Attending: EMERGENCY MEDICINE | Admitting: EMERGENCY MEDICINE
Payer: COMMERCIAL

## 2021-02-18 VITALS
TEMPERATURE: 98.4 F | OXYGEN SATURATION: 98 % | WEIGHT: 262 LBS | BODY MASS INDEX: 42.11 KG/M2 | DIASTOLIC BLOOD PRESSURE: 82 MMHG | HEIGHT: 66 IN | SYSTOLIC BLOOD PRESSURE: 148 MMHG | HEART RATE: 78 BPM | RESPIRATION RATE: 20 BRPM

## 2021-02-18 DIAGNOSIS — R21 FACIAL RASH: ICD-10-CM

## 2021-02-18 DIAGNOSIS — A46 ERYSIPELAS: ICD-10-CM

## 2021-02-18 LAB
ALBUMIN SERPL-MCNC: 3.3 G/DL (ref 3.4–5)
ALP SERPL-CCNC: 93 U/L (ref 40–150)
ALT SERPL W P-5'-P-CCNC: 11 U/L (ref 0–50)
ANION GAP SERPL CALCULATED.3IONS-SCNC: 2 MMOL/L (ref 3–14)
AST SERPL W P-5'-P-CCNC: 21 U/L (ref 0–45)
BASOPHILS # BLD AUTO: 0 10E9/L (ref 0–0.2)
BASOPHILS NFR BLD AUTO: 0.4 %
BILIRUB SERPL-MCNC: 0.5 MG/DL (ref 0.2–1.3)
BUN SERPL-MCNC: 16 MG/DL (ref 7–30)
CALCIUM SERPL-MCNC: 9.4 MG/DL (ref 8.5–10.1)
CHLORIDE SERPL-SCNC: 106 MMOL/L (ref 94–109)
CO2 SERPL-SCNC: 35 MMOL/L (ref 20–32)
CREAT SERPL-MCNC: 0.63 MG/DL (ref 0.52–1.04)
DIFFERENTIAL METHOD BLD: NORMAL
EOSINOPHIL # BLD AUTO: 0.2 10E9/L (ref 0–0.7)
EOSINOPHIL NFR BLD AUTO: 3 %
ERYTHROCYTE [DISTWIDTH] IN BLOOD BY AUTOMATED COUNT: 12.7 % (ref 10–15)
GFR SERPL CREATININE-BSD FRML MDRD: >90 ML/MIN/{1.73_M2}
GLUCOSE SERPL-MCNC: 90 MG/DL (ref 70–99)
HCT VFR BLD AUTO: 41.7 % (ref 35–47)
HGB BLD-MCNC: 13.6 G/DL (ref 11.7–15.7)
IMM GRANULOCYTES # BLD: 0.1 10E9/L (ref 0–0.4)
IMM GRANULOCYTES NFR BLD: 0.7 %
LYMPHOCYTES # BLD AUTO: 1.3 10E9/L (ref 0.8–5.3)
LYMPHOCYTES NFR BLD AUTO: 17.1 %
MCH RBC QN AUTO: 29.4 PG (ref 26.5–33)
MCHC RBC AUTO-ENTMCNC: 32.6 G/DL (ref 31.5–36.5)
MCV RBC AUTO: 90 FL (ref 78–100)
MONOCYTES # BLD AUTO: 0.8 10E9/L (ref 0–1.3)
MONOCYTES NFR BLD AUTO: 10.8 %
NEUTROPHILS # BLD AUTO: 5.1 10E9/L (ref 1.6–8.3)
NEUTROPHILS NFR BLD AUTO: 68 %
NRBC # BLD AUTO: 0 10*3/UL
NRBC BLD AUTO-RTO: 0 /100
PLATELET # BLD AUTO: 260 10E9/L (ref 150–450)
POTASSIUM SERPL-SCNC: 3 MMOL/L (ref 3.4–5.3)
PROT SERPL-MCNC: 7.3 G/DL (ref 6.8–8.8)
RBC # BLD AUTO: 4.63 10E12/L (ref 3.8–5.2)
SODIUM SERPL-SCNC: 143 MMOL/L (ref 133–144)
WBC # BLD AUTO: 7.4 10E9/L (ref 4–11)

## 2021-02-18 PROCEDURE — 250N000013 HC RX MED GY IP 250 OP 250 PS 637: Performed by: EMERGENCY MEDICINE

## 2021-02-18 PROCEDURE — 87040 BLOOD CULTURE FOR BACTERIA: CPT | Performed by: EMERGENCY MEDICINE

## 2021-02-18 PROCEDURE — 96365 THER/PROPH/DIAG IV INF INIT: CPT

## 2021-02-18 PROCEDURE — 99284 EMERGENCY DEPT VISIT MOD MDM: CPT | Mod: 25

## 2021-02-18 PROCEDURE — 85025 COMPLETE CBC W/AUTO DIFF WBC: CPT | Performed by: EMERGENCY MEDICINE

## 2021-02-18 PROCEDURE — 250N000011 HC RX IP 250 OP 636: Performed by: EMERGENCY MEDICINE

## 2021-02-18 PROCEDURE — 80053 COMPREHEN METABOLIC PANEL: CPT | Performed by: EMERGENCY MEDICINE

## 2021-02-18 RX ORDER — DESONIDE 0.5 MG/G
CREAM TOPICAL
Qty: 60 G | Refills: 0 | Status: SHIPPED | OUTPATIENT
Start: 2021-02-18

## 2021-02-18 RX ORDER — CEPHALEXIN 500 MG/1
500 CAPSULE ORAL 4 TIMES DAILY
Qty: 40 CAPSULE | Refills: 0 | Status: SHIPPED | OUTPATIENT
Start: 2021-02-18 | End: 2021-02-28

## 2021-02-18 RX ORDER — POTASSIUM CHLORIDE 1500 MG/1
40 TABLET, EXTENDED RELEASE ORAL ONCE
Status: COMPLETED | OUTPATIENT
Start: 2021-02-18 | End: 2021-02-18

## 2021-02-18 RX ORDER — CEFTRIAXONE 2 G/1
2 INJECTION, POWDER, FOR SOLUTION INTRAMUSCULAR; INTRAVENOUS ONCE
Status: COMPLETED | OUTPATIENT
Start: 2021-02-18 | End: 2021-02-18

## 2021-02-18 RX ADMIN — CEFTRIAXONE SODIUM 2 G: 2 INJECTION, POWDER, FOR SOLUTION INTRAMUSCULAR; INTRAVENOUS at 10:39

## 2021-02-18 RX ADMIN — POTASSIUM CHLORIDE 40 MEQ: 1500 TABLET, EXTENDED RELEASE ORAL at 11:07

## 2021-02-18 ASSESSMENT — MIFFLIN-ST. JEOR: SCORE: 1755.17

## 2021-02-18 ASSESSMENT — ENCOUNTER SYMPTOMS: FACIAL SWELLING: 1

## 2021-02-18 NOTE — ED PROVIDER NOTES
"  History   Chief Complaint:  Facial Swelling       HPI   Lucero Davis is a 65 year old female with history of Parkinson's disease who presents with facial swelling. The patient states that three days ago she noticed redness in the left side of her face. She says she thinks she may have scratched it or itched it earlier, but did not think much of it. She states she really noticed the redness and swelling yesterday, and it has now spread from just her left cheek all the way to her left ear and up above her eye to her forehead. She has put cold packs on her face, which she states have been helping. She denies ear pain, tooth pain, other red spots, or a history of surgery.    Been no acute changes in personal hygiene products, creams, or anything applied to the face.      Review of Systems   HENT: Positive for facial swelling (left side). Negative for dental problem and ear pain.    All other systems reviewed and are negative.      Allergies:  The patient has no known allergies.     Medications:  Sinemet  Lisinopril    Past Medical History:    Parkinson's disease  Vitamin D deficiency    Osteopenia    Past Surgical History:    Dental extractions     Family History:    Prostate cancer- father     Social History:  Accompanied to the ED.    Physical Exam     Patient Vitals for the past 24 hrs:   BP Temp Temp src Pulse Resp SpO2 Height Weight   02/18/21 1109 (!) 148/82 -- -- 78 20 98 % -- --   02/18/21 0955 (!) 166/87 98.4  F (36.9  C) Temporal 90 20 97 % 1.676 m (5' 6\") 118.8 kg (262 lb)       Physical Exam  General: Resting comfortably on the gurney  Head:  The face reveals a significant erythema to the left forehead, left upper and lower eyelid, and left infraorbital region. The skin is warm, indurated, and slightly tender.   Eyes:  The pupils are equal, round, and reactive to light    There is no nystagmus    Extraocular muscles are intact    There is no pain to the eye    Conjunctivae and sclerae are normal  ENT:  "   The nose is normal    Pinnae are normal    The oropharynx is normal, there is no pain to the dentition, upper or lower    Uvula is in the midline  Neck:  Normal range of motion    There is no rigidity noted    There is no midline cervical spine pain/tenderness    Trachea is in the midline    No mass is detected  CV:  Regular rate and underlying rhythm     Normal S1/S2, no S3/S4    No pathological murmur detected  Resp:  Lungs are clear    There is no tachypnea    Non-labored    No rales    No wheezing   GI:  Abdomen is soft, there is no rigidity    No distension    No tympani    No rebound tenderness     Non-surgical without peritoneal features  Skin:  Facial rash as seen below to the left face  Neuro: Speech is normal and fluent  Psych:  Awake. Alert.      Normal affect.  Appropriate interactions.  Lymph: No anterior cervical lymphadenopathy noted            Emergency Department Course     Laboratory:  CBC: WBC 7.4, HGB 13.6,    CMP: Potassium 3.0 (L), Carbon dioxide 35 (H), Anion gap 2 (L), Albumin 3.3 (L) (Creatinine: 0.63) o/w WNL    Blood Culture x2: Pending    Procedures      Emergency Department Course:    Reviewed:  I reviewed nursing notes, vitals, past medical history and care everywhere    Assessments:  1012 I obtained history and examined the patient as noted above.   1040 I rechecked the patient and updated her on plan of care.    Consults:   1034 I consulted Dr. Botello of infectious disease regarding the patient's presentation.     Interventions:  1039 Rocephin 2 g IV  1107 Potassium chloride 40 mEq PO    Disposition:  The patient was discharged to home.     Impression & Plan     CMS Diagnoses: None    Medical Decision Making:  Patient presents with a several day history of mild redness to the left cheek, left eyelids, and now the left forehead.  There are no blisters.  There is no history of cellulitis or MRSA.  There was no history of definitive bug bite.  No new exposures to personal  hygiene products.  This rash, as shown here in the chart, is interesting.  It has the appearance of an acute allergic reaction, but also has some features consistent with erysipelas.  The skin is slightly indurated and well demarcated.  It is red mildly tender and warm.  It is not definitively painful, nor is it really itchy.  The eyelids look more like allergic edema.  This could be consistent with a periorbital preseptal cellulitis and facial cellulitis but also may represent some kind of an allergic mediated response.  Allergy is less certain given its unilateral location and lack of exposures.  I discussed the situation in detail with Dr. Botello from infectious disease and we have elected to treat with intravenous ceftriaxone, oral Keflex at home, and a low potency corticosteroid cream to the face.  We are admittedly treating for both potential etiologies.  The patient does not wish to be admitted and would like to try to go home.  We will send the patient home if things worsen she can return tomorrow.  Is no indication of odontogenic infection or etiology.  There is no evidence of canine space or buccinator abscess.  No evidence of shingles.  There is no evidence of orbital cellulitis.        Diagnosis:    ICD-10-CM    1. Facial rash  R21    2. Erysipelas  A46        Discharge Medications:  New Prescriptions    CEPHALEXIN (KEFLEX) 500 MG CAPSULE    Take 1 capsule (500 mg) by mouth 4 times daily for 10 days    DESONIDE (DESOWEN) 0.05 % EXTERNAL CREAM    Apply to the left facial rash twice a day for the next several days to help with redness and itching       Scribe Disclosure:  IJuliette, am serving as a scribe at 9:42 AM on 2/18/2021 to document services personally performed by Armando Bowers MD based on my observations and the provider's statements to me.     Scribe Disclosure:  I, Alexa Roberts, am serving as a scribe at 10:04 AM on 2/18/2021 to document services personally performed by Armando Bowers  MD [facility] based on my observations and the provider's statements to me.       Armando Bowers MD  02/18/21 0862

## 2021-02-18 NOTE — DISCHARGE INSTRUCTIONS
Discharge Instructions  Cellulitis    Cellulitis is an infection of the skin that occurs when bacteria enter the skin.   Symptoms are generally redness, swelling, warmth and pain.  Your infection appeared to be appropriate to treat at home with antibiotics.  However, sometimes your infection may be worse than it seemed at first, or may worsen with time. If you have new or worse symptoms, you may need to be seen again in the Emergency Department or by your primary provider.    Generally, every Emergency Department visit should have a follow-up clinic visit with either a primary or a specialty clinic/provider. Please follow-up as instructed by your emergency provider today.    Return to the Emergency Department if:  The redness, pain, or swelling gets a lot worse.  If the red area was marked, return if it is red significantly beyond the marked area.  You are unable to get your antibiotics, or are vomiting (throwing up) these pills, or you cannot take them.  You are feeling more ill, weak or lightheaded.  You start to run a new fever (temperature >101 F).  Anything else about the infection worries or concerns you.  Treatment:  Start your antibiotics right away, and take them as prescribed. Be sure to finish the whole prescription, even if you are better.  Apply a heating pad, warm packs, or warm water soaks to the infected area for 15 minutes at a time, at least 3 times a day. Do not use a heating pad on your feet or legs if you have diabetes. Do not sleep with a heating pad on, since this can cause burns or skin injury.  Rest your injured area for at least 1-2 days. After that you may start using your extremity again as long as there is not too much pain.   Raise the injured area above the level of your heart as much as possible in the first 1-2 days.  Tylenol  (acetaminophen), Motrin  (ibuprofen), or Advil  (ibuprofen) may help may help reduce pain and fever and may help you feel more comfortable. Be sure to read and  follow the package directions, and ask your provider if you have questions.    If you were given a prescription for medicine here today, be sure to read all of the information (including the package insert) that comes with your prescription.  This will include important information about the medicine, its side effects, and any warnings that you need to know about.  The pharmacist who fills the prescription can provide more information and answer questions you may have about the medicine.  If you have questions or concerns that the pharmacist cannot address, please call or return to the Emergency Department.     Remember that you can always come back to the Emergency Department if you are not able to see your regular provider in the amount of time listed above, if you get any new symptoms, or if there is anything that worries you.      If there is fever, chills, or worsening in the facial redness, return to the emergency department tomorrow.

## 2021-02-18 NOTE — ED TRIAGE NOTES
Patient reports she woke up with left facial swelling on her cheek and eye. Area is red, warm and swollen. Patient reports vision is intact.

## 2021-02-24 LAB
BACTERIA SPEC CULT: NO GROWTH
BACTERIA SPEC CULT: NO GROWTH
SPECIMEN SOURCE: NORMAL
SPECIMEN SOURCE: NORMAL

## 2024-12-26 ENCOUNTER — HOSPITAL ENCOUNTER (EMERGENCY)
Facility: CLINIC | Age: 69
Discharge: HOME | End: 2024-12-26
Attending: EMERGENCY MEDICINE
Payer: MEDICARE

## 2024-12-26 VITALS
TEMPERATURE: 98.2 F | HEART RATE: 110 BPM | OXYGEN SATURATION: 92 % | SYSTOLIC BLOOD PRESSURE: 170 MMHG | DIASTOLIC BLOOD PRESSURE: 81 MMHG | RESPIRATION RATE: 20 BRPM

## 2024-12-26 DIAGNOSIS — L03.211 CELLULITIS OF FACE: ICD-10-CM

## 2024-12-26 LAB
ANION GAP SERPL CALCULATED.3IONS-SCNC: 13 MMOL/L (ref 7–15)
BASOPHILS # BLD AUTO: 0 10E3/UL (ref 0–0.2)
BASOPHILS NFR BLD AUTO: 0 %
BUN SERPL-MCNC: 8.6 MG/DL (ref 8–23)
CALCIUM SERPL-MCNC: 9.2 MG/DL (ref 8.8–10.4)
CHLORIDE SERPL-SCNC: 100 MMOL/L (ref 98–107)
CREAT SERPL-MCNC: 0.63 MG/DL (ref 0.51–0.95)
EGFRCR SERPLBLD CKD-EPI 2021: >90 ML/MIN/1.73M2
EOSINOPHIL # BLD AUTO: 0 10E3/UL (ref 0–0.7)
EOSINOPHIL NFR BLD AUTO: 0 %
ERYTHROCYTE [DISTWIDTH] IN BLOOD BY AUTOMATED COUNT: 12.9 % (ref 10–15)
GLUCOSE SERPL-MCNC: 99 MG/DL (ref 70–99)
HCO3 SERPL-SCNC: 26 MMOL/L (ref 22–29)
HCT VFR BLD AUTO: 40.3 % (ref 35–47)
HGB BLD-MCNC: 13.2 G/DL (ref 11.7–15.7)
IMM GRANULOCYTES # BLD: 0.1 10E3/UL
IMM GRANULOCYTES NFR BLD: 0 %
LYMPHOCYTES # BLD AUTO: 0.9 10E3/UL (ref 0.8–5.3)
LYMPHOCYTES NFR BLD AUTO: 7 %
MCH RBC QN AUTO: 29.8 PG (ref 26.5–33)
MCHC RBC AUTO-ENTMCNC: 32.8 G/DL (ref 31.5–36.5)
MCV RBC AUTO: 91 FL (ref 78–100)
MONOCYTES # BLD AUTO: 0.6 10E3/UL (ref 0–1.3)
MONOCYTES NFR BLD AUTO: 5 %
NEUTROPHILS # BLD AUTO: 11.8 10E3/UL (ref 1.6–8.3)
NEUTROPHILS NFR BLD AUTO: 88 %
NRBC # BLD AUTO: 0 10E3/UL
NRBC BLD AUTO-RTO: 0 /100
PLATELET # BLD AUTO: 192 10E3/UL (ref 150–450)
POTASSIUM SERPL-SCNC: 3.7 MMOL/L (ref 3.4–5.3)
RBC # BLD AUTO: 4.43 10E6/UL (ref 3.8–5.2)
SODIUM SERPL-SCNC: 139 MMOL/L (ref 135–145)
WBC # BLD AUTO: 13.4 10E3/UL (ref 4–11)

## 2024-12-26 PROCEDURE — 36415 COLL VENOUS BLD VENIPUNCTURE: CPT | Performed by: EMERGENCY MEDICINE

## 2024-12-26 PROCEDURE — 80048 BASIC METABOLIC PNL TOTAL CA: CPT | Performed by: EMERGENCY MEDICINE

## 2024-12-26 PROCEDURE — 85025 COMPLETE CBC W/AUTO DIFF WBC: CPT | Performed by: EMERGENCY MEDICINE

## 2024-12-26 PROCEDURE — 250N000011 HC RX IP 250 OP 636: Performed by: EMERGENCY MEDICINE

## 2024-12-26 PROCEDURE — 250N000013 HC RX MED GY IP 250 OP 250 PS 637: Performed by: EMERGENCY MEDICINE

## 2024-12-26 PROCEDURE — 82374 ASSAY BLOOD CARBON DIOXIDE: CPT | Performed by: EMERGENCY MEDICINE

## 2024-12-26 PROCEDURE — 99284 EMERGENCY DEPT VISIT MOD MDM: CPT | Mod: 25

## 2024-12-26 PROCEDURE — 87040 BLOOD CULTURE FOR BACTERIA: CPT | Performed by: EMERGENCY MEDICINE

## 2024-12-26 PROCEDURE — 96365 THER/PROPH/DIAG IV INF INIT: CPT

## 2024-12-26 RX ORDER — CEFTRIAXONE 2 G/1
2 INJECTION, POWDER, FOR SOLUTION INTRAMUSCULAR; INTRAVENOUS ONCE
Status: COMPLETED | OUTPATIENT
Start: 2024-12-26 | End: 2024-12-26

## 2024-12-26 RX ORDER — CEPHALEXIN 500 MG/1
500 CAPSULE ORAL 4 TIMES DAILY
Qty: 28 CAPSULE | Refills: 0 | Status: SHIPPED | OUTPATIENT
Start: 2024-12-26 | End: 2025-01-02

## 2024-12-26 RX ORDER — IBUPROFEN 600 MG/1
600 TABLET, FILM COATED ORAL ONCE
Status: COMPLETED | OUTPATIENT
Start: 2024-12-26 | End: 2024-12-26

## 2024-12-26 RX ADMIN — CEFTRIAXONE SODIUM 2 G: 2 INJECTION, POWDER, FOR SOLUTION INTRAMUSCULAR; INTRAVENOUS at 19:38

## 2024-12-26 RX ADMIN — IBUPROFEN 600 MG: 600 TABLET ORAL at 15:30

## 2024-12-26 ASSESSMENT — COLUMBIA-SUICIDE SEVERITY RATING SCALE - C-SSRS
2. HAVE YOU ACTUALLY HAD ANY THOUGHTS OF KILLING YOURSELF IN THE PAST MONTH?: NO
1. IN THE PAST MONTH, HAVE YOU WISHED YOU WERE DEAD OR WISHED YOU COULD GO TO SLEEP AND NOT WAKE UP?: NO
6. HAVE YOU EVER DONE ANYTHING, STARTED TO DO ANYTHING, OR PREPARED TO DO ANYTHING TO END YOUR LIFE?: NO

## 2024-12-26 ASSESSMENT — ACTIVITIES OF DAILY LIVING (ADL)
ADLS_ACUITY_SCORE: 41

## 2024-12-26 NOTE — ED TRIAGE NOTES
Pt reports what she believes to be cellulitis on the right side of her face and into her forehead. Pt has had it on the left side of her face in the past. Denies any new lotions or medications, took tylenol last at approx 1100.      Triage Assessment (Adult)       Row Name 12/26/24 1526          Triage Assessment    Airway WDL WDL        Respiratory WDL    Respiratory WDL WDL        Skin Circulation/Temperature WDL    Skin Circulation/Temperature WDL X  redness warmth and swelling to right side of face and forehead        Cardiac WDL    Cardiac WDL X;rhythm     Pulse Rate & Regularity tachycardic        Peripheral/Neurovascular WDL    Peripheral Neurovascular WDL WDL        Cognitive/Neuro/Behavioral WDL    Cognitive/Neuro/Behavioral WDL WDL

## 2024-12-27 RX ORDER — CEPHALEXIN 500 MG/1
500 CAPSULE ORAL 4 TIMES DAILY
Qty: 12 CAPSULE | Refills: 0 | Status: SHIPPED | OUTPATIENT
Start: 2024-12-27 | End: 2024-12-30

## 2024-12-27 NOTE — ED PROVIDER NOTES
Emergency Department Note      History of Present Illness     Chief Complaint   Facial Swelling      HPI   Lucero Davis is a 69 year old female with a history of parkinson disease who presents with facial swelling. Patient notes the onset of her facial rash as two days and notes that it has radiated outwards from her hairline and now predominates on the right side of her face and forehead. Patient notes the onset of her fever as yesterday. She endorses some chills. Lucero endorses a history of a similar episode in 2021 but does not recall having a fever during that period. She denies any allergies to medications. She denies any new lotions or medications. She denies a headache. She denies any eye pain or eye issues. She denies any nausea, vomiting.    Independent Historian   None    Review of External Notes   I reviewed patient's note from their emergency department visit on 02/18/2021 regarding her facial rash.  I reviewed the patient's [facility] record.    Past Medical History     Medical History and Problem List   Parkinson disease (H)  Vitamin D deficiency  Gait disorder  Anxiety    Medications   carbidopa-levodopa (SINEMET)  MG per tablet  lisinopril (ZESTRIL) 5 MG tablet    Surgical History   Past Surgical History:   Procedure Laterality Date    EXTRACTION(S) DENTAL         Physical Exam     Patient Vitals for the past 24 hrs:   BP Temp Temp src Pulse Resp SpO2   12/26/24 1525 (!) 187/75 (!) 102  F (38.9  C) Oral 110 20 92 %     Physical Exam  Nursing note and vitals reviewed.  Constitutional:  Oriented to person, place, and time. Cooperative.   HENT:   Nose:    Nose normal.   Mouth/Throat:   Mucous membranes are normal.   Eyes:    Conjunctivae normal and EOM are normal.      Pupils are equal, round, and reactive to light.   Neck:    Trachea normal.   Cardiovascular:  Normal rate, regular rhythm, normal heart sounds and normal pulses. No murmur heard.  Pulmonary/Chest:  Effort normal and breath sounds  "normal.   Abdominal:   Soft. Normal appearance and bowel sounds are normal.      There is no tenderness.      There is no rebound and no CVA tenderness.   Musculoskeletal:  Extremities atraumatic x 4.   Lymphadenopathy:  No cervical adenopathy.   Neurological:   Alert and oriented to person, place, and time. Normal strength.      No cranial nerve deficit or sensory deficit. GCS eye subscore is 4. GCS verbal subscore is 5. GCS motor subscore is 6.   Skin:    Skin is intact. No rash noted.   Psychiatric:   Normal mood and affect.    Images of Patient's Facial Rash          Diagnostics     Lab Results   Labs Ordered and Resulted from Time of ED Arrival to Time of ED Departure   CBC WITH PLATELETS AND DIFFERENTIAL - Abnormal       Result Value    WBC Count 13.4 (*)     RBC Count 4.43      Hemoglobin 13.2      Hematocrit 40.3      MCV 91      MCH 29.8      MCHC 32.8      RDW 12.9      Platelet Count 192      % Neutrophils 88      % Lymphocytes 7      % Monocytes 5      % Eosinophils 0      % Basophils 0      % Immature Granulocytes 0      NRBCs per 100 WBC 0      Absolute Neutrophils 11.8 (*)     Absolute Lymphocytes 0.9      Absolute Monocytes 0.6      Absolute Eosinophils 0.0      Absolute Basophils 0.0      Absolute Immature Granulocytes 0.1      Absolute NRBCs 0.0     BASIC METABOLIC PANEL - Normal    Sodium 139      Potassium 3.7      Chloride 100      Carbon Dioxide (CO2) 26      Anion Gap 13      Urea Nitrogen 8.6      Creatinine 0.63      GFR Estimate >90      Calcium 9.2      Glucose 99         Imaging   No orders to display     Independent Interpretation   {IndependentReview:424775::\"None\"}    ED Course      Medications Administered   Medications   ibuprofen (ADVIL/MOTRIN) tablet 600 mg (600 mg Oral $Given 12/26/24 1530)       Procedures   Procedures     Discussion of Management   {Consults/Care Discussions:961847::\"None\"}    ED Course   ED Course as of 12/26/24 1831   Thu Dec 26, 2024   1825 I obtained history " "and examined the patient as noted above       Additional Documentation  None    Medical Decision Making / Diagnosis     CMS Diagnoses: {Sepsis/Septic Shock/Stemi/Stroke:922489::\"None\"}    MIPS       {EPPA MIPS:346992::\"None\"}    ELIZABETH Davis is a 69 year old female ***    Disposition   {EPPAFV Dispo:354787}    Diagnosis   No diagnosis found.     Discharge Medications   New Prescriptions    No medications on file         Scribe Disclosure:  I, Arlene Cantu, am serving as a scribe at 6:25 PM on 12/26/2024 to document services personally performed by Shantanu Leggett MD based on my observations and the provider's statements to me.     " days., Disp-28 capsule, R-0, Local Print               Scribe Disclosure:  I, Arlene Prietorupaltaras, am serving as a scribe at 6:25 PM on 12/26/2024 to document services personally performed by Shantanu Leggett MD based on my observations and the provider's statements to me.        Shantanu Leggett MD  12/27/24 0235

## 2024-12-28 NOTE — ED PROVIDER NOTES
I called the patient and she is doing much better today.  I indicated that I had intended on prescribing 10 days of Keflex, but unfortunately I only prescribed 7 days.  I subsequently sent a prescription for 3 additional days and called and left a voicemail on the preferred pharmacy, which is [facility] in [place].  I also told the patient that if for some reason the pharmacy does not get the new prescription, she should either call back here or try and get in touch with her clinic.     Shantanu Leggett MD  12/27/24 4965

## 2024-12-31 LAB
BACTERIA BLD CULT: NO GROWTH
BACTERIA BLD CULT: NO GROWTH

## 2025-05-17 ENCOUNTER — HOSPITAL ENCOUNTER (EMERGENCY)
Facility: CLINIC | Age: 70
Discharge: HOME | End: 2025-05-17
Attending: EMERGENCY MEDICINE | Admitting: EMERGENCY MEDICINE
Payer: MEDICARE

## 2025-05-17 VITALS
HEART RATE: 94 BPM | HEIGHT: 66 IN | BODY MASS INDEX: 40.18 KG/M2 | RESPIRATION RATE: 18 BRPM | DIASTOLIC BLOOD PRESSURE: 78 MMHG | SYSTOLIC BLOOD PRESSURE: 175 MMHG | OXYGEN SATURATION: 93 % | TEMPERATURE: 99.4 F | WEIGHT: 250 LBS

## 2025-05-17 DIAGNOSIS — L03.211 FACIAL CELLULITIS: ICD-10-CM

## 2025-05-17 PROCEDURE — 99284 EMERGENCY DEPT VISIT MOD MDM: CPT | Mod: 25

## 2025-05-17 PROCEDURE — 250N000011 HC RX IP 250 OP 636: Performed by: EMERGENCY MEDICINE

## 2025-05-17 PROCEDURE — 96365 THER/PROPH/DIAG IV INF INIT: CPT

## 2025-05-17 RX ORDER — CEPHALEXIN 500 MG/1
500 CAPSULE ORAL 4 TIMES DAILY
Qty: 28 CAPSULE | Refills: 0 | Status: SHIPPED | OUTPATIENT
Start: 2025-05-17 | End: 2025-05-24

## 2025-05-17 RX ORDER — CEFTRIAXONE 2 G/1
2 INJECTION, POWDER, FOR SOLUTION INTRAMUSCULAR; INTRAVENOUS ONCE
Status: COMPLETED | OUTPATIENT
Start: 2025-05-17 | End: 2025-05-17

## 2025-05-17 RX ADMIN — CEFTRIAXONE SODIUM 2 G: 2 INJECTION, POWDER, FOR SOLUTION INTRAMUSCULAR; INTRAVENOUS at 09:11

## 2025-05-17 ASSESSMENT — ACTIVITIES OF DAILY LIVING (ADL): ADLS_ACUITY_SCORE: 41

## 2025-05-17 ASSESSMENT — COLUMBIA-SUICIDE SEVERITY RATING SCALE - C-SSRS
1. IN THE PAST MONTH, HAVE YOU WISHED YOU WERE DEAD OR WISHED YOU COULD GO TO SLEEP AND NOT WAKE UP?: NO
6. HAVE YOU EVER DONE ANYTHING, STARTED TO DO ANYTHING, OR PREPARED TO DO ANYTHING TO END YOUR LIFE?: NO
2. HAVE YOU ACTUALLY HAD ANY THOUGHTS OF KILLING YOURSELF IN THE PAST MONTH?: NO

## 2025-05-17 NOTE — ED TRIAGE NOTES
Pt reports facial swelling starting two days ago. Pt reports it started with with redness on left side of face now all over upper part of face into scalp.      Triage Assessment (Adult)       Row Name 05/17/25 6997          Triage Assessment    Airway WDL WDL        Respiratory WDL    Respiratory WDL WDL        Cognitive/Neuro/Behavioral WDL    Cognitive/Neuro/Behavioral WDL WDL

## 2025-05-17 NOTE — ED PROVIDER NOTES
"  Emergency Department Note      History of Present Illness     Chief Complaint   Facial Swelling      HPI   Lucero Davis is a 69 year old female who presents to the ED for evaluation of facial swelling. The patient reports that 2 days ago, she developed redness on her right ear. Since then, the redness has spread to her eyes, forehead, and left ear. In addition to the redness, there is significant swelling around the patient's eyes. She endorses a fever and chills but did not formally take her temperature. She has had facial swelling and redness like this twice in the past and states that antibiotics relieved the symptoms in 3 days. She denies dyeing her hair. Denies any new foods, medications, or creams.     Independent Historian   None    Review of External Notes   Yes-I reviewed the patient's visit to the emergency department on December 26 of this past year along with February 18, 2021, both times for issues of signs of facial cellulitis.    Past Medical History     Medical History and Problem List   Parkinson disease  Vitamin D deficiency  Anxiety    Medications   Sinemet  Lisinopril    Surgical History   The patient has no pertinent past surgical history.     Physical Exam     Patient Vitals for the past 24 hrs:   BP Temp Temp src Pulse Resp SpO2 Height Weight   05/17/25 0829 (!) 175/78 99.4  F (37.4  C) Temporal 94 18 93 % 1.676 m (5' 6\") 113.4 kg (250 lb)     Physical Exam  Eye:  Pupils are equal, round, and reactive.  Extraocular movements intact.    ENT:  No rhinorrhea.  Moist mucus membranes.  Normal tongue and tonsil.    Cardiac:  Regular rate and rhythm.  No murmurs, gallops, or rubs.    Pulmonary:  Clear to auscultation bilaterally.  No wheezes, rales, or rhonchi.    Abdomen:  Positive bowel sounds.  Abdomen is soft and non-distended, without focal tenderness.    Musculoskeletal:  Normal movement of all extremities without evidence for deficit.    Skin:  Warm and dry.  The patient shows blanching " redness and swelling to bilateral forehead, left ear, and bilateral eyes as noted in the picture below.  There is no associated blistering or vesicles noted.    Neurologic:  Non-focal exam without asymmetric weakness or numbness.     Psychiatric:  Normal affect with appropriate interaction with examiner.         Diagnostics     Lab Results   Labs Ordered and Resulted from Time of ED Arrival to Time of ED Departure - No data to display    Imaging   No orders to display       EKG   None    Independent Interpretation   None    ED Course      Medications Administered   Medications   cefTRIAXone (ROCEPHIN) 2 g vial to attach to  ml bag for ADULTS or NS 50 ml bag for PEDS (0 g Intravenous Stopped 5/17/25 0943)       Procedures   None     Discussion of Management   None    ED Course   ED Course as of 05/17/25 1428   Sat May 17, 2025   0846 I obtained history and examined the patient as noted above.        Additional Documentation  None    Medical Decision Making / Diagnosis     CMS Diagnoses: None    MIPS   None    MDM   Lucero Davis is a 69 year old female presents to us with ongoing issues of intermittent facial cellulitis.  I invite the reader to review her visits to emergency department 2021 and approximately 5 months ago where she was seen for similar issues.  Both times, symptoms improved very quickly with a dose of Rocephin and oral Keflex.  Considering she underwent very thorough investigations these prior to times and her symptoms are exactly the same today, the patient is eager not to pursue repeat testing.  She is hoping to simply be placed on antibiotics.  I voiced my concerns of more serious pathology such as orbital and periorbital cellulitis along with allergic phenomenon.  However, considering she has done well on these treatments in the past, I we will defer to her desires not to undergo further testing or admission.  However, it was exceedingly clear there is diagnostic uncertainty here and there  should be no hesitation on her part to return to us immediately if she is not showing significant improvement.  She was advised to follow closely with her primary team and dermatology.  The patient's questions were answered and she is comfort the plan for discharge.    Disposition   The patient was discharged.     Diagnosis     ICD-10-CM    1. Facial cellulitis  L03.211            Discharge Medications   Discharge Medication List as of 5/17/2025  9:44 AM        START taking these medications    Details   cephALEXin (KEFLEX) 500 MG capsule Take 1 capsule (500 mg) by mouth 4 times daily for 7 days., Disp-28 capsule, R-0, E-Prescribe               Scribe Disclosure:  IGarrett, am serving as a scribe at 8:33 AM on 5/17/2025 to document services personally performed by Trierweiler, Chad A, MD based on my observations and the provider's statements to me.        Trierweiler, Chad A, MD  05/17/25 2047

## 2025-05-17 NOTE — DISCHARGE INSTRUCTIONS
As discussed, the exact cause of your facial infection is unclear.  However, considering you have improved in the past with antibiotics, we will continue this plan.  However, there should be no hesitation in your part to return to us if you have any worsening of your condition, especially if it involves increasing pain, fever, lightheadedness, or other emergent concerns.    Discharge Instructions  Cellulitis    Cellulitis is an infection of the skin that occurs when bacteria enter the skin.   Symptoms are generally redness, swelling, warmth and pain.  Your infection appeared to be appropriate to treat at home with antibiotics.  However, sometimes your infection may be worse than it seemed at first, or may worsen with time. If you have new or worse symptoms, you may need to be seen again in the Emergency Department or by your primary provider.    Generally, every Emergency Department visit should have a follow-up clinic visit with either a primary or a specialty clinic/provider. Please follow-up as instructed by your emergency provider today.    Return to the Emergency Department if:  The redness, pain, or swelling gets a lot worse.  If the red area was marked, return if it is red significantly beyond the marked area.  You are unable to get your antibiotics, or are vomiting (throwing up) these pills, or you cannot take them.  You are feeling more ill, weak or lightheaded.  You start to run a new fever (temperature >101 F).  Anything else about the infection worries or concerns you.  Treatment:  Start your antibiotics right away, and take them as prescribed. Be sure to finish the whole prescription, even if you are better.  Apply a heating pad, warm packs, or warm water soaks to the infected area for 15 minutes at a time, at least 3 times a day. Do not use a heating pad on your feet or legs if you have diabetes. Do not sleep with a heating pad on, since this can cause burns or skin injury.  Rest your injured area for  at least 1-2 days. After that you may start using your extremity again as long as there is not too much pain.   Raise the injured area above the level of your heart as much as possible in the first 1-2 days.  Tylenol  (acetaminophen), Motrin  (ibuprofen), or Advil  (ibuprofen) may help may help reduce pain and fever and may help you feel more comfortable. Be sure to read and follow the package directions, and ask your provider if you have questions.    If you were given a prescription for medicine here today, be sure to read all of the information (including the package insert) that comes with your prescription.  This will include important information about the medicine, its side effects, and any warnings that you need to know about.  The pharmacist who fills the prescription can provide more information and answer questions you may have about the medicine.  If you have questions or concerns that the pharmacist cannot address, please call or return to the Emergency Department.     Remember that you can always come back to the Emergency Department if you are not able to see your regular provider in the amount of time listed above, if you get any new symptoms, or if there is anything that worries you.